# Patient Record
Sex: MALE | Race: WHITE
[De-identification: names, ages, dates, MRNs, and addresses within clinical notes are randomized per-mention and may not be internally consistent; named-entity substitution may affect disease eponyms.]

---

## 2019-06-02 ENCOUNTER — HOSPITAL ENCOUNTER (EMERGENCY)
Dept: HOSPITAL 83 - ED | Age: 74
Discharge: LEFT BEFORE DISCHARGE | End: 2019-06-02
Payer: COMMERCIAL

## 2019-06-02 VITALS — HEIGHT: 60 IN | WEIGHT: 170 LBS

## 2019-06-02 DIAGNOSIS — R79.1: ICD-10-CM

## 2019-06-02 DIAGNOSIS — F98.5: ICD-10-CM

## 2019-06-02 DIAGNOSIS — I10: ICD-10-CM

## 2019-06-02 DIAGNOSIS — R45.1: ICD-10-CM

## 2019-06-02 DIAGNOSIS — R41.82: Primary | ICD-10-CM

## 2019-06-02 DIAGNOSIS — R47.81: ICD-10-CM

## 2019-06-02 DIAGNOSIS — R53.1: ICD-10-CM

## 2019-06-02 LAB
ALBUMIN SERPL-MCNC: 3.4 GM/DL (ref 3.1–4.5)
ALP SERPL-CCNC: 89 U/L (ref 45–117)
ALT SERPL W P-5'-P-CCNC: 14 U/L (ref 12–78)
APPEARANCE UR: (no result)
APTT PPP: 26.7 SECONDS (ref 20–32.1)
AST SERPL-CCNC: 11 IU/L (ref 3–35)
BASOPHILS # BLD AUTO: 0.1 10*3/UL (ref 0–0.1)
BASOPHILS NFR BLD AUTO: 1.1 % (ref 0–1)
BILIRUB UR QL STRIP: NEGATIVE
BUN SERPL-MCNC: 10 MG/DL (ref 7–24)
CHLORIDE SERPL-SCNC: 109 MMOL/L (ref 98–107)
COLOR UR: YELLOW
CREAT SERPL-MCNC: 0.88 MG/DL (ref 0.7–1.3)
EOSINOPHIL # BLD AUTO: 0.2 10*3/UL (ref 0–0.4)
EOSINOPHIL # BLD AUTO: 2.9 % (ref 1–4)
ERYTHROCYTE [DISTWIDTH] IN BLOOD BY AUTOMATED COUNT: 12.6 % (ref 0–14.5)
GLUCOSE UR QL: NEGATIVE
HCT VFR BLD AUTO: 40.6 % (ref 42–52)
HGB BLD-MCNC: 13.8 G/DL (ref 14–18)
HGB UR QL STRIP: (no result)
INR BLD: 1 (ref 2–3.5)
KETONES UR QL STRIP: NEGATIVE
LEUKOCYTE ESTERASE UR QL STRIP: (no result)
LYMPHOCYTES # BLD AUTO: 1.6 10*3/UL (ref 1.3–4.4)
LYMPHOCYTES NFR BLD AUTO: 29.2 % (ref 27–41)
MCH RBC QN AUTO: 32.2 PG (ref 27–31)
MCHC RBC AUTO-ENTMCNC: 34 G/DL (ref 33–37)
MCV RBC AUTO: 94.9 FL (ref 80–94)
MONOCYTES # BLD AUTO: 0.5 10*3/UL (ref 0.1–1)
MONOCYTES NFR BLD MANUAL: 9.9 % (ref 3–9)
NEUT #: 3.1 10*3/UL (ref 2.3–7.9)
NEUT %: 56.5 % (ref 47–73)
NITRITE UR QL STRIP: NEGATIVE
NRBC BLD QL AUTO: 0 10*3/UL (ref 0–0)
PH UR STRIP: 7 [PH] (ref 5–9)
PLATELET # BLD AUTO: 200 10*3/UL (ref 130–400)
PMV BLD AUTO: 8.9 FL (ref 9.6–12.3)
POTASSIUM SERPL-SCNC: 4 MMOL/L (ref 3.5–5.1)
PROT SERPL-MCNC: 7 GM/DL (ref 6.4–8.2)
RBC # BLD AUTO: 4.28 10*6/UL (ref 4.5–5.9)
RBC #/AREA URNS HPF: (no result) RBC/HPF (ref 0–2)
SODIUM SERPL-SCNC: 140 MMOL/L (ref 136–145)
SP GR UR: <= 1.005 (ref 1–1.03)
TROPONIN I SERPL-MCNC: < 0.015 NG/ML (ref ?–0.04)
UROBILINOGEN UR STRIP-MCNC: 1 E.U./DL (ref 0.2–1)
WBC #/AREA URNS HPF: (no result) WBC/HPF (ref 0–5)
WBC NRBC COR # BLD AUTO: 5.5 10*3/UL (ref 4.8–10.8)

## 2019-06-10 RX ORDER — LANOLIN ALCOHOL/MO/W.PET/CERES
1000 CREAM (GRAM) TOPICAL DAILY
COMMUNITY

## 2019-06-10 RX ORDER — LOSARTAN POTASSIUM 25 MG/1
25 TABLET ORAL EVERY MORNING
COMMUNITY
End: 2019-06-11 | Stop reason: SDUPTHER

## 2019-06-10 RX ORDER — ACETAMINOPHEN 160 MG
1 TABLET,DISINTEGRATING ORAL DAILY
COMMUNITY

## 2019-06-10 RX ORDER — CLOPIDOGREL BISULFATE 75 MG/1
75 TABLET ORAL DAILY
COMMUNITY
End: 2019-08-15 | Stop reason: SDUPTHER

## 2019-06-11 ENCOUNTER — OFFICE VISIT (OUTPATIENT)
Dept: PRIMARY CARE CLINIC | Age: 74
End: 2019-06-11
Payer: COMMERCIAL

## 2019-06-11 VITALS
SYSTOLIC BLOOD PRESSURE: 134 MMHG | TEMPERATURE: 98.1 F | BODY MASS INDEX: 32.93 KG/M2 | WEIGHT: 230 LBS | DIASTOLIC BLOOD PRESSURE: 82 MMHG | HEIGHT: 70 IN

## 2019-06-11 DIAGNOSIS — R55 VASOVAGAL SYNCOPE: Primary | ICD-10-CM

## 2019-06-11 DIAGNOSIS — M15.9 PRIMARY OSTEOARTHRITIS INVOLVING MULTIPLE JOINTS: ICD-10-CM

## 2019-06-11 DIAGNOSIS — G62.9 NEUROPATHY: ICD-10-CM

## 2019-06-11 DIAGNOSIS — I95.1 ORTHOSTATIC HYPOTENSION: ICD-10-CM

## 2019-06-11 PROBLEM — M15.0 PRIMARY OSTEOARTHRITIS INVOLVING MULTIPLE JOINTS: Status: ACTIVE | Noted: 2019-06-11

## 2019-06-11 PROCEDURE — 99214 OFFICE O/P EST MOD 30 MIN: CPT | Performed by: FAMILY MEDICINE

## 2019-06-11 RX ORDER — LOSARTAN POTASSIUM 25 MG/1
25 TABLET ORAL EVERY MORNING
Qty: 90 TABLET | Refills: 5 | Status: SHIPPED | OUTPATIENT
Start: 2019-06-11 | End: 2019-08-15 | Stop reason: SDUPTHER

## 2019-06-11 ASSESSMENT — ENCOUNTER SYMPTOMS
EYES NEGATIVE: 1
RESPIRATORY NEGATIVE: 1

## 2019-06-11 NOTE — PROGRESS NOTES
19  Name: Deb Duff    : 1945    Sex: male    Age: 75 y.o. Subjective:  Chief Complaint: Patient is here for [address] [place] [address] he was visiting in [facility] when he had an episode of lightheadedness and syncope and weakness. Taken to the emergency room there. Work-up basically was negative he was told to follow-up with neurology. He has been perfectly fine since that episode. These can things have happened to him in the past little today his sister did drive him. He is in his wheelchair. Review of Systems   HENT: Negative. Eyes: Negative. Respiratory: Negative. Cardiovascular: Negative. Neurological:        See HPI.          Current Outpatient Medications:     losartan (COZAAR) 25 MG tablet, Take 1 tablet by mouth every morning, Disp: 90 tablet, Rfl: 5    clopidogrel (PLAVIX) 75 MG tablet, Take 75 mg by mouth daily, Disp: , Rfl:     vitamin B-12 (CYANOCOBALAMIN) 1000 MCG tablet, Take 1,000 mcg by mouth daily, Disp: , Rfl:     Cholecalciferol (VITAMIN D3) 2000 units CAPS, Take 1 capsule by mouth daily, Disp: , Rfl:     famotidine (PEPCID) 40 MG tablet, , Disp: , Rfl:     FLUoxetine (PROZAC) 20 MG capsule, , Disp: , Rfl:     PREVNAR 13 SUSP inj, ADM 0.5ML IM UTD, Disp: , Rfl: 0    metoprolol succinate (TOPROL XL) 50 MG extended release tablet, Take 50 mg by mouth 2 times daily , Disp: , Rfl:     aspirin EC 81 MG EC tablet, Take 81 mg by mouth daily, Disp: , Rfl:   Allergies   Allergen Reactions    Lidocaine     Lodine [Etodolac]     Rofecoxib      Social History     Socioeconomic History    Marital status:      Spouse name: Not on file    Number of children: Not on file    Years of education: Not on file    Highest education level: Not on file   Occupational History    Not on file   Social Needs    Financial resource strain: Not on file    Food insecurity:     Worry: Not on file     Inability: Not on file    Transportation needs:     Medical: ONE YEAR---10-17    EVAL CARDIO [facility] DR BELTRE-----3-18    THIRD POSS TIA 3-18--ADDED PLAVIX 4-18    HAS ELECTRIC CHAIR AND VAN    WIFE DEMENTIA 2-19 REFUSES TO Marisol Bella          Past Medical History:   Diagnosis Date    Atrial septal defect     BPH (benign prostatic hyperplasia)     Cardiac arrhythmia     Conduction disorder of the heart     Degenerative joint disease involving multiple joints     Depression     Erectile dysfunction     Gastritis     GERD (gastroesophageal reflux disease)     Hyperlipidemia     Hypertension     Neurocardiogenic syncope     Neuropathic pain of both legs     Orthostatic hypotension     Osteoporosis     Ruptured ear drum, left     Spastic hemiplegic cerebral palsy (HCC)     Syncope      No family history on file. Past Surgical History:   Procedure Laterality Date    CARPAL TUNNEL RELEASE Left     ECHOCARDIOGRAM COMPLETE 2D W DOPPLER W COLOR  10/31/2012         INGUINAL HERNIA REPAIR        Vitals:    06/11/19 1034   BP: 134/82   Temp: 98.1 °F (36.7 °C)   Weight: 230 lb (104.3 kg)   Height: 5' 10\" (1.778 m)       Objective:    Physical Exam   Constitutional: He is oriented to person, place, and time. Cardiovascular: Normal rate and regular rhythm. Pulmonary/Chest: Effort normal.   Abdominal: Soft. Musculoskeletal:   His exam is baseline. He does have calluses of the lower extremities. Neurological: He is alert and oriented to person, place, and time. Rufino Culp was seen today for other. Diagnoses and all orders for this visit:    Vasovagal syncope    Primary osteoarthritis involving multiple joints    Neuropathy    Orthostatic hypotension    Other orders  -     losartan (COZAAR) 25 MG tablet; Take 1 tablet by mouth every morning        Comments: Refuses to consider seeing neurology. Informed him of symptoms to change to notify as soon as possible. If recurs go to the emergency room. A great deal of time spent reviewing medications, diet, exercise, social issues. Also reviewing the chart before entering the room with patient and finishing charting after leaving patient's room. More than half of that time was spent face to face with the patient in counseling and coordinating care. See me in 2 weeks we will further review his hospital record and notify me if any symptoms change. Follow Up: Return in about 2 weeks (around 6/25/2019).      Seen by:  Mindy Alvarado DO

## 2019-06-25 ENCOUNTER — OFFICE VISIT (OUTPATIENT)
Dept: PRIMARY CARE CLINIC | Age: 74
End: 2019-06-25
Payer: COMMERCIAL

## 2019-06-25 VITALS
HEIGHT: 70 IN | SYSTOLIC BLOOD PRESSURE: 128 MMHG | BODY MASS INDEX: 33 KG/M2 | DIASTOLIC BLOOD PRESSURE: 78 MMHG | TEMPERATURE: 97.9 F

## 2019-06-25 DIAGNOSIS — R55 VASOVAGAL SYNCOPE: Primary | ICD-10-CM

## 2019-06-25 DIAGNOSIS — M15.9 PRIMARY OSTEOARTHRITIS INVOLVING MULTIPLE JOINTS: ICD-10-CM

## 2019-06-25 PROCEDURE — 99213 OFFICE O/P EST LOW 20 MIN: CPT | Performed by: FAMILY MEDICINE

## 2019-06-25 ASSESSMENT — ENCOUNTER SYMPTOMS
RESPIRATORY NEGATIVE: 1
EYES NEGATIVE: 1
GASTROINTESTINAL NEGATIVE: 1

## 2019-06-25 NOTE — PROGRESS NOTES
19  Name: Bianca Sanchez    : 1945    Sex: male    Age: 75 y.o. Subjective:  Chief Complaint: Patient is here for 2 week ck re  Syncope spell     Feels fine  No more sx    Feels ok      No  Lightheaded  Sx  Any  Diff then last  40 yr s per pt       Ros neg         Review of Systems   HENT: Negative. Eyes: Negative. Respiratory: Negative. Cardiovascular: Negative. Gastrointestinal: Negative.           Current Outpatient Medications:     losartan (COZAAR) 25 MG tablet, Take 1 tablet by mouth every morning, Disp: 90 tablet, Rfl: 5    clopidogrel (PLAVIX) 75 MG tablet, Take 75 mg by mouth daily, Disp: , Rfl:     vitamin B-12 (CYANOCOBALAMIN) 1000 MCG tablet, Take 1,000 mcg by mouth daily, Disp: , Rfl:     Cholecalciferol (VITAMIN D3) 2000 units CAPS, Take 1 capsule by mouth daily, Disp: , Rfl:     famotidine (PEPCID) 40 MG tablet, , Disp: , Rfl:     FLUoxetine (PROZAC) 20 MG capsule, , Disp: , Rfl:     PREVNAR 13 SUSP inj, ADM 0.5ML IM UTD, Disp: , Rfl: 0    metoprolol succinate (TOPROL XL) 50 MG extended release tablet, Take 50 mg by mouth 2 times daily , Disp: , Rfl:     aspirin EC 81 MG EC tablet, Take 81 mg by mouth daily, Disp: , Rfl:   Allergies   Allergen Reactions    Lidocaine     Lodine [Etodolac]     Rofecoxib      Social History     Socioeconomic History    Marital status:      Spouse name: Not on file    Number of children: Not on file    Years of education: Not on file    Highest education level: Not on file   Occupational History    Not on file   Social Needs    Financial resource strain: Not on file    Food insecurity:     Worry: Not on file     Inability: Not on file    Transportation needs:     Medical: Not on file     Non-medical: Not on file   Tobacco Use    Smoking status: Former Smoker    Smokeless tobacco: Never Used   Substance and Sexual Activity    Alcohol use: No    Drug use: No    Sexual activity: Not on file   Lifestyle    Physical activity:     Days per week: Not on file     Minutes per session: Not on file    Stress: Not on file   Relationships    Social connections:     Talks on phone: Not on file     Gets together: Not on file     Attends [employer] service: Not on file     Active member of club or organization: Not on file     Attends meetings of clubs or organizations: Not on file     Relationship status: Not on file    Intimate partner violence:     Fear of current or ex partner: Not on file     Emotionally abused: Not on file     Physically abused: Not on file     Forced sexual activity: Not on file   Other Topics Concern    Not on file   Social History Narrative        CARDIAC [address]    GERD    DISABILITY FOR CRUSHED FEET/LOW BACK/ L HAND----WINDOW [address]  POS TTT PALLIDINO    NEUROCARDIOGENIC SYNCOPE    SMOKER QUIT 25 YRS AGO    HX L CTS OR    Mat Canavan  1945 Page #2    Chris Dotter L ING HERNIA OR    L ANKEL FUSION    BPH WITH ELEV PSA---BX    ED    OSTEOPOROSIS    RUPTURED L EAR DRUM    SMALL R RENAL DIS    [facility] OFF ROOF    FX L FERUR 3-07    NEUROPATHY LEGS--VIT-B-12 MONTHLY    ONE CHILD WITH WIFE (BOY)----  AT 37 WITH KIDNEY FAILURE----- AND SHE HAS 7    OTHER CHILDREN    WIFE  09 ON PT BIRTHDAY WITH CA THAT SHE IGNORED--BREAST--SHE 9 YRS OLDER     --SHE 6 YRS OLDER    SYNCOPE 4-10--REFUSED TO SE PALIDINO    ED    COLON 9-10 D R MICKY---DUE 10 YRS    SYNCOPE  [facility]---POSS TIA    ST E [id number]    EVAL DR VINES 10-17 WITH ECHO WITH PFO OR ASD AND JSUT CONT SAME WITH    ASA---PLAN RE CK ONE YEAR    EVAL DR ADAMS NEURO SAID NOT SURE IF TIA AND SEE ONE YEAR---10-17    EVAL CARDIO [facility] DR BELTRE-----3-18    THIRD POSS TIA 3-18--ADDED PLAVIX     HAS ELECTRIC CHAIR AND VAN    WIFE DEMENTIA  REFUSES TO Blanchie Jest          Past Medical History:   Diagnosis Date    Atrial septal defect     BPH (benign prostatic hyperplasia)     Cardiac arrhythmia     Conduction disorder of the heart     Degenerative joint disease involving multiple joints     Depression     Erectile dysfunction     Gastritis     GERD (gastroesophageal reflux disease)     Hyperlipidemia     Hypertension     Neurocardiogenic syncope     Neuropathic pain of both legs     Orthostatic hypotension     Osteoporosis     Ruptured ear drum, left     Spastic hemiplegic cerebral palsy (HCC)     Syncope      No family history on file. Past Surgical History:   Procedure Laterality Date    CARPAL TUNNEL RELEASE Left     ECHOCARDIOGRAM COMPLETE 2D W DOPPLER W COLOR  10/31/2012         INGUINAL HERNIA REPAIR        Vitals:    06/25/19 1057   BP: 128/78   Temp: 97.9 °F (36.6 °C)   Height: 5' 10\" (1.778 m)       Objective:    Physical Exam   Eyes: Pupils are equal, round, and reactive to light. EOM are normal.   Neck: Normal range of motion. Neck supple. Cardiovascular: Normal rate and regular rhythm. Pulmonary/Chest: Effort normal and breath sounds normal.   Abdominal: Soft. Julio Mendes was seen today for other. Diagnoses and all orders for this visit:    Vasovagal syncope    Primary osteoarthritis involving multiple joints        Comments: diet exer  rom      exer taught    reviewd  hosp record  From  [facility]     A great deal of time spent reviewing medications, diet, exercise, social issues. Also reviewing the chart before entering the room with patient and finishing charting after leaving patient's room. More than half of that time was spent face to face with the patient in counseling and coordinating care. Follow Up: Return for reg.      Seen by:  Brii Mckeon DO

## 2019-07-17 ENCOUNTER — TELEPHONE (OUTPATIENT)
Dept: PHARMACY | Facility: CLINIC | Age: 74
End: 2019-07-17

## 2019-07-18 ENCOUNTER — TELEPHONE (OUTPATIENT)
Dept: PRIMARY CARE CLINIC | Age: 74
End: 2019-07-18

## 2019-07-24 ENCOUNTER — HOSPITAL ENCOUNTER (OUTPATIENT)
Age: 74
Discharge: HOME | End: 2019-07-26
Payer: COMMERCIAL

## 2019-07-24 LAB
ALBUMIN SERPL-MCNC: 3.9 G/DL (ref 3.5–5.2)
ALP BLD-CCNC: 77 U/L (ref 40–129)
ALT SERPL-CCNC: 9 U/L (ref 0–40)
ANION GAP SERPL CALCULATED.3IONS-SCNC: 14 MMOL/L (ref 7–16)
AST SERPL-CCNC: 13 U/L (ref 0–39)
BASOPHILS ABSOLUTE: 0.06 E9/L (ref 0–0.2)
BASOPHILS RELATIVE PERCENT: 1.2 % (ref 0–2)
BILIRUB SERPL-MCNC: 0.4 MG/DL (ref 0–1.2)
BUN BLDV-MCNC: 13 MG/DL (ref 8–23)
CALCIUM SERPL-MCNC: 9.1 MG/DL (ref 8.6–10.2)
CHLORIDE BLD-SCNC: 102 MMOL/L (ref 98–107)
CHOLESTEROL, TOTAL: 136 MG/DL (ref 0–199)
CO2: 23 MMOL/L (ref 22–29)
CREAT SERPL-MCNC: 0.8 MG/DL (ref 0.7–1.2)
EOSINOPHILS ABSOLUTE: 0.19 E9/L (ref 0.05–0.5)
EOSINOPHILS RELATIVE PERCENT: 3.8 % (ref 0–6)
GFR AFRICAN AMERICAN: >60
GFR NON-AFRICAN AMERICAN: >60 ML/MIN/1.73
GLUCOSE BLD-MCNC: 97 MG/DL (ref 74–99)
HBA1C MFR BLD: 5.3 % (ref 4–5.6)
HCT VFR BLD CALC: 41.6 % (ref 37–54)
HDLC SERPL-MCNC: 41 MG/DL
HEMOGLOBIN: 13.8 G/DL (ref 12.5–16.5)
IMMATURE GRANULOCYTES #: 0.02 E9/L
IMMATURE GRANULOCYTES %: 0.4 % (ref 0–5)
LDL CHOLESTEROL CALCULATED: 78 MG/DL (ref 0–99)
LYMPHOCYTES ABSOLUTE: 1.41 E9/L (ref 1.5–4)
LYMPHOCYTES RELATIVE PERCENT: 28.5 % (ref 20–42)
MCH RBC QN AUTO: 31.6 PG (ref 26–35)
MCHC RBC AUTO-ENTMCNC: 33.2 % (ref 32–34.5)
MCV RBC AUTO: 95.2 FL (ref 80–99.9)
MONOCYTES ABSOLUTE: 0.49 E9/L (ref 0.1–0.95)
MONOCYTES RELATIVE PERCENT: 9.9 % (ref 2–12)
NEUTROPHILS ABSOLUTE: 2.78 E9/L (ref 1.8–7.3)
NEUTROPHILS RELATIVE PERCENT: 56.2 % (ref 43–80)
PDW BLD-RTO: 12.9 FL (ref 11.5–15)
PLATELET # BLD: 224 E9/L (ref 130–450)
PMV BLD AUTO: 9.3 FL (ref 7–12)
POTASSIUM SERPL-SCNC: 4.4 MMOL/L (ref 3.5–5)
RBC # BLD: 4.37 E12/L (ref 3.8–5.8)
SODIUM BLD-SCNC: 139 MMOL/L (ref 132–146)
T4 TOTAL: 5.2 MCG/DL (ref 4.5–11.7)
TOTAL PROTEIN: 6.8 G/DL (ref 6.4–8.3)
TRIGL SERPL-MCNC: 84 MG/DL (ref 0–149)
TSH SERPL DL<=0.05 MIU/L-ACNC: 2.53 UIU/ML (ref 0.27–4.2)
URIC ACID, SERUM: 4.2 MG/DL (ref 3.4–7)
VLDLC SERPL CALC-MCNC: 17 MG/DL
WBC # BLD: 5 E9/L (ref 4.5–11.5)

## 2019-07-24 PROCEDURE — 84443 ASSAY THYROID STIM HORMONE: CPT

## 2019-07-24 PROCEDURE — 36415 COLL VENOUS BLD VENIPUNCTURE: CPT

## 2019-07-24 PROCEDURE — 85025 COMPLETE CBC W/AUTO DIFF WBC: CPT

## 2019-07-24 PROCEDURE — 84550 ASSAY OF BLOOD/URIC ACID: CPT

## 2019-07-24 PROCEDURE — 83036 HEMOGLOBIN GLYCOSYLATED A1C: CPT

## 2019-07-24 PROCEDURE — 84436 ASSAY OF TOTAL THYROXINE: CPT

## 2019-07-24 PROCEDURE — 80053 COMPREHEN METABOLIC PANEL: CPT

## 2019-07-24 PROCEDURE — 80061 LIPID PANEL: CPT

## 2019-08-14 ENCOUNTER — TELEPHONE (OUTPATIENT)
Dept: PRIMARY CARE CLINIC | Age: 74
End: 2019-08-14

## 2019-08-15 ENCOUNTER — OFFICE VISIT (OUTPATIENT)
Dept: PRIMARY CARE CLINIC | Age: 74
End: 2019-08-15
Payer: COMMERCIAL

## 2019-08-15 VITALS — DIASTOLIC BLOOD PRESSURE: 68 MMHG | SYSTOLIC BLOOD PRESSURE: 132 MMHG | TEMPERATURE: 98 F

## 2019-08-15 DIAGNOSIS — F41.9 ANXIETY: ICD-10-CM

## 2019-08-15 DIAGNOSIS — E78.2 MIXED HYPERLIPIDEMIA: Primary | ICD-10-CM

## 2019-08-15 DIAGNOSIS — K21.9 GASTROESOPHAGEAL REFLUX DISEASE WITHOUT ESOPHAGITIS: ICD-10-CM

## 2019-08-15 DIAGNOSIS — Z12.5 PROSTATE CANCER SCREENING: ICD-10-CM

## 2019-08-15 DIAGNOSIS — I10 ESSENTIAL HYPERTENSION: ICD-10-CM

## 2019-08-15 DIAGNOSIS — G80.2 SPASTIC HEMIPLEGIC CEREBRAL PALSY (HCC): ICD-10-CM

## 2019-08-15 PROCEDURE — 99214 OFFICE O/P EST MOD 30 MIN: CPT | Performed by: FAMILY MEDICINE

## 2019-08-15 RX ORDER — CLOPIDOGREL BISULFATE 75 MG/1
75 TABLET ORAL DAILY
Qty: 90 TABLET | Refills: 5 | Status: SHIPPED
Start: 2019-08-15 | End: 2020-02-24 | Stop reason: SDUPTHER

## 2019-08-15 RX ORDER — FAMOTIDINE 40 MG/1
40 TABLET, FILM COATED ORAL NIGHTLY PRN
Qty: 90 TABLET | Refills: 5 | Status: SHIPPED
Start: 2019-08-15 | End: 2020-04-14 | Stop reason: SDUPTHER

## 2019-08-15 RX ORDER — LOSARTAN POTASSIUM 25 MG/1
25 TABLET ORAL EVERY MORNING
Qty: 90 TABLET | Refills: 5 | Status: SHIPPED
Start: 2019-08-15 | End: 2020-06-16 | Stop reason: SDUPTHER

## 2019-08-15 RX ORDER — FLUOXETINE HYDROCHLORIDE 20 MG/1
20 CAPSULE ORAL 2 TIMES DAILY
Qty: 90 CAPSULE | Refills: 5 | Status: SHIPPED
Start: 2019-08-15 | End: 2020-02-13 | Stop reason: SDUPTHER

## 2019-08-15 RX ORDER — METOPROLOL SUCCINATE 50 MG/1
50 TABLET, EXTENDED RELEASE ORAL 2 TIMES DAILY
Qty: 90 TABLET | Refills: 5 | Status: SHIPPED
Start: 2019-08-15 | End: 2020-04-14 | Stop reason: SDUPTHER

## 2019-08-15 ASSESSMENT — ENCOUNTER SYMPTOMS
GASTROINTESTINAL NEGATIVE: 1
RESPIRATORY NEGATIVE: 1
ALLERGIC/IMMUNOLOGIC NEGATIVE: 1
EYES NEGATIVE: 1

## 2020-02-10 ENCOUNTER — HOSPITAL ENCOUNTER (OUTPATIENT)
Age: 75
Discharge: HOME | End: 2020-02-12
Payer: MEDICARE

## 2020-02-10 LAB
ALBUMIN SERPL-MCNC: 3.9 G/DL (ref 3.5–5.2)
ALP BLD-CCNC: 78 U/L (ref 40–129)
ALT SERPL-CCNC: 6 U/L (ref 0–40)
ANION GAP SERPL CALCULATED.3IONS-SCNC: 16 MMOL/L (ref 7–16)
AST SERPL-CCNC: 15 U/L (ref 0–39)
BASOPHILS ABSOLUTE: 0.06 E9/L (ref 0–0.2)
BASOPHILS RELATIVE PERCENT: 1.2 % (ref 0–2)
BILIRUB SERPL-MCNC: 0.8 MG/DL (ref 0–1.2)
BUN BLDV-MCNC: 13 MG/DL (ref 8–23)
CALCIUM SERPL-MCNC: 9.2 MG/DL (ref 8.6–10.2)
CHLORIDE BLD-SCNC: 104 MMOL/L (ref 98–107)
CHOLESTEROL, TOTAL: 138 MG/DL (ref 0–199)
CO2: 22 MMOL/L (ref 22–29)
CREAT SERPL-MCNC: 0.9 MG/DL (ref 0.7–1.2)
EOSINOPHILS ABSOLUTE: 0.23 E9/L (ref 0.05–0.5)
EOSINOPHILS RELATIVE PERCENT: 4.7 % (ref 0–6)
GFR AFRICAN AMERICAN: >60
GFR NON-AFRICAN AMERICAN: >60 ML/MIN/1.73
GLUCOSE BLD-MCNC: 101 MG/DL (ref 74–99)
HCT VFR BLD CALC: 44.1 % (ref 37–54)
HDLC SERPL-MCNC: 41 MG/DL
HEMOGLOBIN: 14.1 G/DL (ref 12.5–16.5)
IMMATURE GRANULOCYTES #: 0.01 E9/L
IMMATURE GRANULOCYTES %: 0.2 % (ref 0–5)
LDL CHOLESTEROL CALCULATED: 76 MG/DL (ref 0–99)
LYMPHOCYTES ABSOLUTE: 1.46 E9/L (ref 1.5–4)
LYMPHOCYTES RELATIVE PERCENT: 29.6 % (ref 20–42)
MCH RBC QN AUTO: 30.9 PG (ref 26–35)
MCHC RBC AUTO-ENTMCNC: 32 % (ref 32–34.5)
MCV RBC AUTO: 96.7 FL (ref 80–99.9)
MONOCYTES ABSOLUTE: 0.41 E9/L (ref 0.1–0.95)
MONOCYTES RELATIVE PERCENT: 8.3 % (ref 2–12)
NEUTROPHILS ABSOLUTE: 2.76 E9/L (ref 1.8–7.3)
NEUTROPHILS RELATIVE PERCENT: 56 % (ref 43–80)
PDW BLD-RTO: 12.7 FL (ref 11.5–15)
PLATELET # BLD: 215 E9/L (ref 130–450)
PMV BLD AUTO: 9.1 FL (ref 7–12)
POTASSIUM SERPL-SCNC: 4 MMOL/L (ref 3.5–5)
PROSTATE SPECIFIC ANTIGEN: 2.41 NG/ML (ref 0–4)
RBC # BLD: 4.56 E12/L (ref 3.8–5.8)
SODIUM BLD-SCNC: 142 MMOL/L (ref 132–146)
TOTAL PROTEIN: 7.1 G/DL (ref 6.4–8.3)
TRIGL SERPL-MCNC: 105 MG/DL (ref 0–149)
VLDLC SERPL CALC-MCNC: 21 MG/DL
WBC # BLD: 4.9 E9/L (ref 4.5–11.5)

## 2020-02-10 PROCEDURE — G0103 PSA SCREENING: HCPCS

## 2020-02-10 PROCEDURE — 85025 COMPLETE CBC W/AUTO DIFF WBC: CPT

## 2020-02-10 PROCEDURE — 80053 COMPREHEN METABOLIC PANEL: CPT

## 2020-02-10 PROCEDURE — 36415 COLL VENOUS BLD VENIPUNCTURE: CPT

## 2020-02-10 PROCEDURE — 80061 LIPID PANEL: CPT

## 2020-02-13 ENCOUNTER — OFFICE VISIT (OUTPATIENT)
Dept: PRIMARY CARE CLINIC | Age: 75
End: 2020-02-13
Payer: MEDICARE

## 2020-02-13 VITALS
HEART RATE: 56 BPM | OXYGEN SATURATION: 97 % | TEMPERATURE: 98.4 F | DIASTOLIC BLOOD PRESSURE: 78 MMHG | RESPIRATION RATE: 16 BRPM | HEIGHT: 70 IN | WEIGHT: 208 LBS | BODY MASS INDEX: 29.78 KG/M2 | SYSTOLIC BLOOD PRESSURE: 128 MMHG

## 2020-02-13 PROCEDURE — 99214 OFFICE O/P EST MOD 30 MIN: CPT | Performed by: FAMILY MEDICINE

## 2020-02-13 RX ORDER — FLUOXETINE HYDROCHLORIDE 20 MG/1
20 CAPSULE ORAL 2 TIMES DAILY
Qty: 180 CAPSULE | Refills: 5 | Status: SHIPPED
Start: 2020-02-13 | End: 2021-03-04 | Stop reason: SDUPTHER

## 2020-02-13 ASSESSMENT — PATIENT HEALTH QUESTIONNAIRE - PHQ9
SUM OF ALL RESPONSES TO PHQ9 QUESTIONS 1 & 2: 0
SUM OF ALL RESPONSES TO PHQ QUESTIONS 1-9: 0
SUM OF ALL RESPONSES TO PHQ QUESTIONS 1-9: 0
1. LITTLE INTEREST OR PLEASURE IN DOING THINGS: 0
2. FEELING DOWN, DEPRESSED OR HOPELESS: 0

## 2020-02-13 ASSESSMENT — ENCOUNTER SYMPTOMS
GASTROINTESTINAL NEGATIVE: 1
ALLERGIC/IMMUNOLOGIC NEGATIVE: 1
RESPIRATORY NEGATIVE: 1
EYES NEGATIVE: 1

## 2020-02-13 NOTE — PROGRESS NOTES
20  Name: Stephanie Cadena    : 1945    Sex: male    Age: 75 y.o. Subjective:  Chief Complaint: Patient is here for 6 mo ck   Re     htn an dlab     Feel ok  No cp or sob         In  Reg  wc  His  elc  Broke down  Lab ntoed      Lab ntoed   fbs sl up   meds  reivwed    Anxiety is well controlled. .  Spasticity is unchanged from the cerebral palsy. Blood pressure checks at home and is acceptable. Does get reflux and is forced to take the reflux medication on a daily basis. He is aware of the long-term risk. Arthritic symptoms of both knees and hips but doing well. Review of Systems   Constitutional: Negative. HENT: Negative. Eyes: Negative. Respiratory: Negative. Cardiovascular: Negative. Gastrointestinal: Negative. Endocrine: Negative. Genitourinary: Negative. Musculoskeletal: Negative. Skin: Negative. Allergic/Immunologic: Negative. Neurological:        Legs  weakness   Hematological: Negative. Psychiatric/Behavioral: Negative.           Current Outpatient Medications:     FLUoxetine (PROZAC) 20 MG capsule, Take 1 capsule by mouth 2 times daily, Disp: 180 capsule, Rfl: 5    losartan (COZAAR) 25 MG tablet, Take 1 tablet by mouth every morning, Disp: 90 tablet, Rfl: 5    clopidogrel (PLAVIX) 75 MG tablet, Take 1 tablet by mouth daily, Disp: 90 tablet, Rfl: 5    metoprolol succinate (TOPROL XL) 50 MG extended release tablet, Take 1 tablet by mouth 2 times daily, Disp: 90 tablet, Rfl: 5    famotidine (PEPCID) 40 MG tablet, Take 1 tablet by mouth nightly as needed (gerd), Disp: 90 tablet, Rfl: 5    vitamin B-12 (CYANOCOBALAMIN) 1000 MCG tablet, Take 1,000 mcg by mouth daily, Disp: , Rfl:     Cholecalciferol (VITAMIN D3) 2000 units CAPS, Take 1 capsule by mouth daily, Disp: , Rfl:     aspirin EC 81 MG EC tablet, Take 81 mg by mouth daily, Disp: , Rfl:   Allergies   Allergen Reactions    Lidocaine     Lodine [Etodolac]     Rofecoxib      Social History sounds. Abdominal:      Palpations: Abdomen is soft. Musculoskeletal:      Comments: Dec  str  Both legs   Skin:     General: Skin is warm. Neurological:      Mental Status: He is alert and oriented to person, place, and time. Psychiatric:         Behavior: Behavior normal.         Wade Luke was seen today for discuss labs. Diagnoses and all orders for this visit:    Mixed hyperlipidemia    Spastic hemiplegic cerebral palsy (HCC)    Anxiety  -     FLUoxetine (PROZAC) 20 MG capsule; Take 1 capsule by mouth 2 times daily    Essential hypertension    Gastroesophageal reflux disease without esophagitis    Primary osteoarthritis involving multiple joints        Comments: Low-fat low sugar diet. Exercise taught for spasticity and arthritic symptoms. Check blood pressure weekly at home. Offered physical therapy and declines. He is very functional in his situation. He does drive sometimes. A great deal of time spent reviewing medications, diet, exercise, social issues. Also reviewing the chart before entering the room with patient and finishing charting after leaving patient's room. More than half of that time was spent face to face with the patient in counseling and coordinating care. Follow Up: Return in about 6 months (around 8/13/2020) for lab  before.      Seen by:  Peter Bergman DO

## 2020-02-14 PROBLEM — M15.0 PRIMARY OSTEOARTHRITIS INVOLVING MULTIPLE JOINTS: Chronic | Status: ACTIVE | Noted: 2019-06-11

## 2020-02-14 PROBLEM — R55 VASOVAGAL SYNCOPE: Chronic | Status: ACTIVE | Noted: 2019-06-11

## 2020-02-14 PROBLEM — M15.9 PRIMARY OSTEOARTHRITIS INVOLVING MULTIPLE JOINTS: Chronic | Status: ACTIVE | Noted: 2019-06-11

## 2020-02-14 PROBLEM — E78.2 MIXED HYPERLIPIDEMIA: Chronic | Status: ACTIVE | Noted: 2019-08-15

## 2020-02-14 PROBLEM — K21.9 GASTROESOPHAGEAL REFLUX DISEASE WITHOUT ESOPHAGITIS: Chronic | Status: ACTIVE | Noted: 2019-08-15

## 2020-02-14 PROBLEM — F41.9 ANXIETY: Chronic | Status: ACTIVE | Noted: 2019-08-15

## 2020-02-14 PROBLEM — I95.1 ORTHOSTATIC HYPOTENSION: Chronic | Status: ACTIVE | Noted: 2019-06-11

## 2020-02-14 PROBLEM — G80.2 SPASTIC HEMIPLEGIC CEREBRAL PALSY (HCC): Chronic | Status: ACTIVE | Noted: 2019-08-15

## 2020-02-14 PROBLEM — I10 ESSENTIAL HYPERTENSION: Chronic | Status: ACTIVE | Noted: 2019-08-15

## 2020-02-14 PROBLEM — G62.9 NEUROPATHY: Chronic | Status: ACTIVE | Noted: 2019-06-11

## 2020-02-24 RX ORDER — CLOPIDOGREL BISULFATE 75 MG/1
75 TABLET ORAL DAILY
Qty: 90 TABLET | Refills: 5 | Status: SHIPPED
Start: 2020-02-24 | End: 2021-03-04 | Stop reason: SDUPTHER

## 2020-04-14 RX ORDER — METOPROLOL SUCCINATE 50 MG/1
50 TABLET, EXTENDED RELEASE ORAL 2 TIMES DAILY
Qty: 90 TABLET | Refills: 5 | Status: SHIPPED
Start: 2020-04-14 | End: 2021-01-26 | Stop reason: SDUPTHER

## 2020-04-14 RX ORDER — FAMOTIDINE 40 MG/1
40 TABLET, FILM COATED ORAL NIGHTLY PRN
Qty: 90 TABLET | Refills: 5 | Status: SHIPPED
Start: 2020-04-14 | End: 2020-08-13

## 2020-06-16 RX ORDER — LOSARTAN POTASSIUM 25 MG/1
25 TABLET ORAL EVERY MORNING
Qty: 90 TABLET | Refills: 5 | Status: SHIPPED
Start: 2020-06-16 | End: 2021-03-04 | Stop reason: SDUPTHER

## 2020-08-03 ENCOUNTER — HOSPITAL ENCOUNTER (OUTPATIENT)
Age: 75
Discharge: HOME | End: 2020-08-05
Payer: MEDICARE

## 2020-08-03 LAB
ALBUMIN SERPL-MCNC: 3.8 G/DL (ref 3.5–5.2)
ALP BLD-CCNC: 71 U/L (ref 40–129)
ALT SERPL-CCNC: 5 U/L (ref 0–40)
ANION GAP SERPL CALCULATED.3IONS-SCNC: 17 MMOL/L (ref 7–16)
AST SERPL-CCNC: 13 U/L (ref 0–39)
BASOPHILS ABSOLUTE: 0.07 E9/L (ref 0–0.2)
BASOPHILS RELATIVE PERCENT: 1.4 % (ref 0–2)
BILIRUB SERPL-MCNC: 0.6 MG/DL (ref 0–1.2)
BUN BLDV-MCNC: 17 MG/DL (ref 8–23)
CALCIUM SERPL-MCNC: 9.2 MG/DL (ref 8.6–10.2)
CHLORIDE BLD-SCNC: 104 MMOL/L (ref 98–107)
CHOLESTEROL, TOTAL: 131 MG/DL (ref 0–199)
CO2: 20 MMOL/L (ref 22–29)
CREAT SERPL-MCNC: 0.9 MG/DL (ref 0.7–1.2)
EOSINOPHILS ABSOLUTE: 0.18 E9/L (ref 0.05–0.5)
EOSINOPHILS RELATIVE PERCENT: 3.6 % (ref 0–6)
GFR AFRICAN AMERICAN: >60
GFR NON-AFRICAN AMERICAN: >60 ML/MIN/1.73
GLUCOSE BLD-MCNC: 101 MG/DL (ref 74–99)
HBA1C MFR BLD: 5.3 % (ref 4–5.6)
HCT VFR BLD CALC: 40.3 % (ref 37–54)
HDLC SERPL-MCNC: 42 MG/DL
HEMOGLOBIN: 13.4 G/DL (ref 12.5–16.5)
IMMATURE GRANULOCYTES #: 0.03 E9/L
IMMATURE GRANULOCYTES %: 0.6 % (ref 0–5)
LDL CHOLESTEROL CALCULATED: 70 MG/DL (ref 0–99)
LYMPHOCYTES ABSOLUTE: 1.44 E9/L (ref 1.5–4)
LYMPHOCYTES RELATIVE PERCENT: 28.6 % (ref 20–42)
MCH RBC QN AUTO: 31.2 PG (ref 26–35)
MCHC RBC AUTO-ENTMCNC: 33.3 % (ref 32–34.5)
MCV RBC AUTO: 93.7 FL (ref 80–99.9)
MONOCYTES ABSOLUTE: 0.41 E9/L (ref 0.1–0.95)
MONOCYTES RELATIVE PERCENT: 8.1 % (ref 2–12)
NEUTROPHILS ABSOLUTE: 2.91 E9/L (ref 1.8–7.3)
NEUTROPHILS RELATIVE PERCENT: 57.7 % (ref 43–80)
PDW BLD-RTO: 12.8 FL (ref 11.5–15)
PLATELET # BLD: 212 E9/L (ref 130–450)
PMV BLD AUTO: 9.3 FL (ref 7–12)
POTASSIUM SERPL-SCNC: 4.1 MMOL/L (ref 3.5–5)
RBC # BLD: 4.3 E12/L (ref 3.8–5.8)
SODIUM BLD-SCNC: 141 MMOL/L (ref 132–146)
T4 TOTAL: 5.4 MCG/DL (ref 4.5–11.7)
TOTAL PROTEIN: 6.6 G/DL (ref 6.4–8.3)
TRIGL SERPL-MCNC: 96 MG/DL (ref 0–149)
TSH SERPL DL<=0.05 MIU/L-ACNC: 1.82 UIU/ML (ref 0.27–4.2)
VLDLC SERPL CALC-MCNC: 19 MG/DL
WBC # BLD: 5 E9/L (ref 4.5–11.5)

## 2020-08-03 PROCEDURE — 84443 ASSAY THYROID STIM HORMONE: CPT

## 2020-08-03 PROCEDURE — 80053 COMPREHEN METABOLIC PANEL: CPT

## 2020-08-03 PROCEDURE — 84436 ASSAY OF TOTAL THYROXINE: CPT

## 2020-08-03 PROCEDURE — 85025 COMPLETE CBC W/AUTO DIFF WBC: CPT

## 2020-08-03 PROCEDURE — 36415 COLL VENOUS BLD VENIPUNCTURE: CPT

## 2020-08-03 PROCEDURE — 80061 LIPID PANEL: CPT

## 2020-08-03 PROCEDURE — 83036 HEMOGLOBIN GLYCOSYLATED A1C: CPT

## 2020-08-05 LAB
BILIRUBIN URINE: NEGATIVE
BLOOD, URINE: NEGATIVE
CLARITY: NORMAL
COLOR: YELLOW
GLUCOSE URINE: NEGATIVE MG/DL
KETONES, URINE: NEGATIVE MG/DL
LEUKOCYTE ESTERASE, URINE: NEGATIVE
NITRITE, URINE: NEGATIVE
PH UA: 6.5 (ref 5–9)
PROTEIN UA: NEGATIVE MG/DL
SPECIFIC GRAVITY UA: 1.01 (ref 1–1.03)
UROBILINOGEN, URINE: 0.2 E.U./DL

## 2020-08-05 PROCEDURE — 81001 URINALYSIS AUTO W/SCOPE: CPT

## 2020-08-06 LAB
AMORPHOUS: NORMAL
BACTERIA: NORMAL /HPF
RBC UA: NORMAL /HPF (ref 0–2)
WBC UA: NORMAL /HPF (ref 0–5)

## 2020-08-13 ENCOUNTER — OFFICE VISIT (OUTPATIENT)
Dept: PRIMARY CARE CLINIC | Age: 75
End: 2020-08-13
Payer: MEDICARE

## 2020-08-13 VITALS
DIASTOLIC BLOOD PRESSURE: 82 MMHG | BODY MASS INDEX: 30.42 KG/M2 | WEIGHT: 212 LBS | SYSTOLIC BLOOD PRESSURE: 128 MMHG | TEMPERATURE: 97.7 F

## 2020-08-13 PROCEDURE — 99214 OFFICE O/P EST MOD 30 MIN: CPT | Performed by: FAMILY MEDICINE

## 2020-08-13 ASSESSMENT — ENCOUNTER SYMPTOMS
GASTROINTESTINAL NEGATIVE: 1
EYES NEGATIVE: 1
RESPIRATORY NEGATIVE: 1
ALLERGIC/IMMUNOLOGIC NEGATIVE: 1

## 2020-08-13 ASSESSMENT — PATIENT HEALTH QUESTIONNAIRE - PHQ9
SUM OF ALL RESPONSES TO PHQ9 QUESTIONS 1 & 2: 0
2. FEELING DOWN, DEPRESSED OR HOPELESS: 0
SUM OF ALL RESPONSES TO PHQ QUESTIONS 1-9: 0
SUM OF ALL RESPONSES TO PHQ QUESTIONS 1-9: 0
1. LITTLE INTEREST OR PLEASURE IN DOING THINGS: 0

## 2020-08-13 NOTE — PROGRESS NOTES
20  Name: aKleb Schmitt    : 1945    Sex: male    Age: 75 y.o. Subjective:  Chief Complaint: Patient is here for 6 mo ck  Re   bp chol lab  palvix     Feel ok  No  Cp or sob   Wt up  4 lbs   Stress with  [employer] school karlos dying  Has new elex wc    Spasticity uncahnged   Lab ntoe an dok     Doing the gardening  Pt dc  pepcid     dwhen it was back orderd and fien without  It 5 mo      Review of Systems   Constitutional: Negative. HENT: Negative. Eyes: Negative. Respiratory: Negative. Cardiovascular: Negative. Gastrointestinal: Negative. Endocrine: Negative. Genitourinary: Negative. Musculoskeletal: Positive for arthralgias. Skin: Negative. Allergic/Immunologic: Negative. Neurological:        Spasticity    Hematological: Negative. Psychiatric/Behavioral: Negative.           Current Outpatient Medications:     losartan (COZAAR) 25 MG tablet, Take 1 tablet by mouth every morning, Disp: 90 tablet, Rfl: 5    metoprolol succinate (TOPROL XL) 50 MG extended release tablet, Take 1 tablet by mouth 2 times daily, Disp: 90 tablet, Rfl: 5    clopidogrel (PLAVIX) 75 MG tablet, Take 1 tablet by mouth daily, Disp: 90 tablet, Rfl: 5    FLUoxetine (PROZAC) 20 MG capsule, Take 1 capsule by mouth 2 times daily, Disp: 180 capsule, Rfl: 5    vitamin B-12 (CYANOCOBALAMIN) 1000 MCG tablet, Take 1,000 mcg by mouth daily, Disp: , Rfl:     Cholecalciferol (VITAMIN D3) 2000 units CAPS, Take 1 capsule by mouth daily, Disp: , Rfl:     aspirin EC 81 MG EC tablet, Take 81 mg by mouth daily, Disp: , Rfl:   Allergies   Allergen Reactions    Lidocaine     Lodine [Etodolac]     Rofecoxib      Social History     Socioeconomic History    Marital status:      Spouse name: Not on file    Number of children: Not on file    Years of education: Not on file    Highest education level: Not on file   Occupational History    Not on file   Social Needs    Financial resource strain: Not on 4-10--REFUSED TO SE PALIDINO    ED    COLON 9-10 D R MICKY---DUE 10 YRS    SYNCOPE 8-17 [facility]---POSS TIA    ST E [id number]    EVAL DR VINES 10-17 WITH ECHO WITH PFO OR ASD AND JSUT CONT SAME WITH    ASA---PLAN RE CK ONE YEAR    EVAL DR ADAMS NEURO SAID NOT SURE IF TIA AND SEE ONE YEAR---10-17    EVAL CARDIO [facility] DR BELTRE-----3-18    THIRD POSS TIA 3-18--ADDED PLAVIX 4-18    HAS ELECTRIC CHAIR AND VAN    WIFE DEMENTIA 2-19 REFUSES TO Kika Ramirez          Past Medical History:   Diagnosis Date    Atrial septal defect     BPH (benign prostatic hyperplasia)     Cardiac arrhythmia     Conduction disorder of the heart     Degenerative joint disease involving multiple joints     Depression     Erectile dysfunction     Gastritis     GERD (gastroesophageal reflux disease)     Hyperlipidemia     Hypertension     Neurocardiogenic syncope     Neuropathic pain of both legs     Orthostatic hypotension     Osteoporosis     Ruptured ear drum, left     Spastic hemiplegic cerebral palsy (HCC)     Syncope      No family history on file. Past Surgical History:   Procedure Laterality Date    CARPAL TUNNEL RELEASE Left     ECHOCARDIOGRAM COMPLETE 2D W DOPPLER W COLOR  10/31/2012         INGUINAL HERNIA REPAIR        Vitals:    08/13/20 1108   BP: 128/82   Temp: 97.7 °F (36.5 °C)   TempSrc: Temporal   Weight: 212 lb (96.2 kg)  Comment: pt reported       Objective:    Physical Exam  Vitals signs reviewed. Constitutional:       Appearance: He is well-developed. HENT:      Head: Normocephalic. Eyes:      Pupils: Pupils are equal, round, and reactive to light. Neck:      Musculoskeletal: Normal range of motion. Cardiovascular:      Rate and Rhythm: Normal rate and regular rhythm. Pulmonary:      Effort: Pulmonary effort is normal.      Breath sounds: Normal breath sounds. Abdominal:      Palpations: Abdomen is soft.    Musculoskeletal:      Comments: Dec rom both knees  Spasticity  All muscles Skin:     General: Skin is warm. Neurological:      Mental Status: He is alert and oriented to person, place, and time. Psychiatric:         Behavior: Behavior normal.         Poornima Krueger was seen today for discuss labs. Diagnoses and all orders for this visit:    Essential hypertension    Spastic hemiplegic cerebral palsy (HCC)    Primary osteoarthritis involving multiple joints    Orthostatic hypotension    Mixed hyperlipidemia    Anxiety        Comments: low fat and sguar diet  Hm issues lose wt  exer   rom e xer taguht   strss with wife   Offer pt and he ref     Get up slowly       lwo fat   anx well cotnrolled   A great deal of time spent reviewing medications, diet, exercise, social issues. Also reviewing the chart before entering the room with patient and finishing charting after leaving patient's room. More than half of that time was spent face to face with the patient in counseling and coordinating care. Follow Up: Return in about 6 months (around 2/13/2021) for Lab Before.      Seen by:  Les Galindo DO

## 2021-01-26 DIAGNOSIS — I10 ESSENTIAL HYPERTENSION: ICD-10-CM

## 2021-01-27 RX ORDER — METOPROLOL SUCCINATE 50 MG/1
50 TABLET, EXTENDED RELEASE ORAL 2 TIMES DAILY
Qty: 180 TABLET | Refills: 5 | Status: SHIPPED
Start: 2021-01-27 | End: 2021-03-04 | Stop reason: SDUPTHER

## 2021-02-04 DIAGNOSIS — I10 ESSENTIAL HYPERTENSION: Chronic | ICD-10-CM

## 2021-02-04 DIAGNOSIS — E78.2 MIXED HYPERLIPIDEMIA: Chronic | ICD-10-CM

## 2021-02-04 DIAGNOSIS — Z12.5 PROSTATE CANCER SCREENING: ICD-10-CM

## 2021-02-04 DIAGNOSIS — E03.9 ACQUIRED HYPOTHYROIDISM: ICD-10-CM

## 2021-02-04 DIAGNOSIS — R73.03 PRE-DIABETES: ICD-10-CM

## 2021-02-04 DIAGNOSIS — M81.0 AGE-RELATED OSTEOPOROSIS WITHOUT CURRENT PATHOLOGICAL FRACTURE: ICD-10-CM

## 2021-02-04 LAB
ALBUMIN SERPL-MCNC: 4 G/DL (ref 3.5–5.2)
ALP BLD-CCNC: 75 U/L (ref 40–129)
ALT SERPL-CCNC: 8 U/L (ref 0–40)
ANION GAP SERPL CALCULATED.3IONS-SCNC: 12 MMOL/L (ref 7–16)
AST SERPL-CCNC: 16 U/L (ref 0–39)
BACTERIA: ABNORMAL /HPF
BASOPHILS ABSOLUTE: 0.05 E9/L (ref 0–0.2)
BASOPHILS RELATIVE PERCENT: 0.9 % (ref 0–2)
BILIRUB SERPL-MCNC: 0.5 MG/DL (ref 0–1.2)
BILIRUBIN URINE: NEGATIVE
BLOOD, URINE: ABNORMAL
BUN BLDV-MCNC: 16 MG/DL (ref 8–23)
CALCIUM SERPL-MCNC: 9.3 MG/DL (ref 8.6–10.2)
CHLORIDE BLD-SCNC: 102 MMOL/L (ref 98–107)
CHOLESTEROL, TOTAL: 130 MG/DL (ref 0–199)
CLARITY: CLEAR
CO2: 24 MMOL/L (ref 22–29)
COLOR: YELLOW
CREAT SERPL-MCNC: 0.8 MG/DL (ref 0.7–1.2)
EOSINOPHILS ABSOLUTE: 0.18 E9/L (ref 0.05–0.5)
EOSINOPHILS RELATIVE PERCENT: 3.2 % (ref 0–6)
GFR AFRICAN AMERICAN: >60
GFR NON-AFRICAN AMERICAN: >60 ML/MIN/1.73
GLUCOSE BLD-MCNC: 102 MG/DL (ref 74–99)
GLUCOSE URINE: NEGATIVE MG/DL
HBA1C MFR BLD: 5.2 % (ref 4–5.6)
HCT VFR BLD CALC: 43.5 % (ref 37–54)
HDLC SERPL-MCNC: 43 MG/DL
HEMOGLOBIN: 13.6 G/DL (ref 12.5–16.5)
IMMATURE GRANULOCYTES #: 0.03 E9/L
IMMATURE GRANULOCYTES %: 0.5 % (ref 0–5)
KETONES, URINE: NEGATIVE MG/DL
LDL CHOLESTEROL CALCULATED: 65 MG/DL (ref 0–99)
LEUKOCYTE ESTERASE, URINE: NEGATIVE
LYMPHOCYTES ABSOLUTE: 1.51 E9/L (ref 1.5–4)
LYMPHOCYTES RELATIVE PERCENT: 26.5 % (ref 20–42)
MCH RBC QN AUTO: 30.4 PG (ref 26–35)
MCHC RBC AUTO-ENTMCNC: 31.3 % (ref 32–34.5)
MCV RBC AUTO: 97.3 FL (ref 80–99.9)
MONOCYTES ABSOLUTE: 0.55 E9/L (ref 0.1–0.95)
MONOCYTES RELATIVE PERCENT: 9.7 % (ref 2–12)
NEUTROPHILS ABSOLUTE: 3.37 E9/L (ref 1.8–7.3)
NEUTROPHILS RELATIVE PERCENT: 59.2 % (ref 43–80)
NITRITE, URINE: NEGATIVE
PDW BLD-RTO: 13.1 FL (ref 11.5–15)
PH UA: 6 (ref 5–9)
PLATELET # BLD: 226 E9/L (ref 130–450)
PMV BLD AUTO: 9.1 FL (ref 7–12)
POTASSIUM SERPL-SCNC: 4 MMOL/L (ref 3.5–5)
PROSTATE SPECIFIC ANTIGEN: 1.97 NG/ML (ref 0–4)
PROTEIN UA: NEGATIVE MG/DL
RBC # BLD: 4.47 E12/L (ref 3.8–5.8)
RBC UA: ABNORMAL /HPF (ref 0–2)
SODIUM BLD-SCNC: 138 MMOL/L (ref 132–146)
SPECIFIC GRAVITY UA: 1.02 (ref 1–1.03)
T4 TOTAL: 4.9 MCG/DL (ref 4.5–11.7)
TOTAL PROTEIN: 6.8 G/DL (ref 6.4–8.3)
TRIGL SERPL-MCNC: 110 MG/DL (ref 0–149)
TSH SERPL DL<=0.05 MIU/L-ACNC: 1.94 UIU/ML (ref 0.27–4.2)
UROBILINOGEN, URINE: 0.2 E.U./DL
VLDLC SERPL CALC-MCNC: 22 MG/DL
WBC # BLD: 5.7 E9/L (ref 4.5–11.5)
WBC UA: ABNORMAL /HPF (ref 0–5)

## 2021-02-05 LAB — VITAMIN D 25-HYDROXY: 74 NG/ML (ref 30–100)

## 2021-03-04 ENCOUNTER — OFFICE VISIT (OUTPATIENT)
Dept: PRIMARY CARE CLINIC | Age: 76
End: 2021-03-04
Payer: MEDICARE

## 2021-03-04 VITALS — DIASTOLIC BLOOD PRESSURE: 78 MMHG | TEMPERATURE: 98.5 F | SYSTOLIC BLOOD PRESSURE: 128 MMHG

## 2021-03-04 DIAGNOSIS — F41.9 ANXIETY: ICD-10-CM

## 2021-03-04 DIAGNOSIS — I10 ESSENTIAL HYPERTENSION: Chronic | ICD-10-CM

## 2021-03-04 DIAGNOSIS — M15.9 PRIMARY OSTEOARTHRITIS INVOLVING MULTIPLE JOINTS: Chronic | ICD-10-CM

## 2021-03-04 DIAGNOSIS — G80.2 SPASTIC HEMIPLEGIC CEREBRAL PALSY (HCC): Chronic | ICD-10-CM

## 2021-03-04 DIAGNOSIS — Z00.00 ROUTINE GENERAL MEDICAL EXAMINATION AT A HEALTH CARE FACILITY: Primary | ICD-10-CM

## 2021-03-04 DIAGNOSIS — E78.2 MIXED HYPERLIPIDEMIA: Chronic | ICD-10-CM

## 2021-03-04 DIAGNOSIS — R73.03 PRE-DIABETES: ICD-10-CM

## 2021-03-04 PROCEDURE — G0439 PPPS, SUBSEQ VISIT: HCPCS | Performed by: FAMILY MEDICINE

## 2021-03-04 RX ORDER — CLOPIDOGREL BISULFATE 75 MG/1
75 TABLET ORAL DAILY
Qty: 90 TABLET | Refills: 5 | Status: SHIPPED
Start: 2021-03-04 | End: 2022-03-08 | Stop reason: SDUPTHER

## 2021-03-04 RX ORDER — LOSARTAN POTASSIUM 25 MG/1
25 TABLET ORAL EVERY MORNING
Qty: 90 TABLET | Refills: 5 | Status: SHIPPED
Start: 2021-03-04 | End: 2021-09-21 | Stop reason: SDUPTHER

## 2021-03-04 RX ORDER — FLUOXETINE HYDROCHLORIDE 20 MG/1
20 CAPSULE ORAL 2 TIMES DAILY
Qty: 180 CAPSULE | Refills: 5 | Status: SHIPPED
Start: 2021-03-04 | End: 2021-04-12 | Stop reason: SDUPTHER

## 2021-03-04 RX ORDER — METOPROLOL SUCCINATE 50 MG/1
50 TABLET, EXTENDED RELEASE ORAL 2 TIMES DAILY
Qty: 180 TABLET | Refills: 5 | Status: SHIPPED
Start: 2021-03-04 | End: 2022-02-07 | Stop reason: SDUPTHER

## 2021-03-04 ASSESSMENT — PATIENT HEALTH QUESTIONNAIRE - PHQ9
SUM OF ALL RESPONSES TO PHQ QUESTIONS 1-9: 0
2. FEELING DOWN, DEPRESSED OR HOPELESS: 0

## 2021-03-04 ASSESSMENT — LIFESTYLE VARIABLES
AUDIT TOTAL SCORE: INCOMPLETE
AUDIT-C TOTAL SCORE: INCOMPLETE
HOW OFTEN DO YOU HAVE A DRINK CONTAINING ALCOHOL: NEVER

## 2021-03-04 NOTE — PROGRESS NOTES
Medicare Annual Wellness Visit  Name: Sofia Cancer Date: 3/4/2021   MRN: [id number] Sex: Male   Age: 67 y.o. Ethnicity: Non-/Non    : 1945 Race: Martha Estrella is here for Medicare AWV, Discuss Labs, Hypertension, and Hyperlipidemia      6 mo ck  Re     bp   Chol   wc    Pt states feels great    No cp rosob  [place]  Drove here     Lab   Good        Screenings for behavioral, psychosocial and functional/safety risks, and cognitive dysfunction are all negative except as indicated below. These results, as well as other patient data from the [employer] [address] form, are documented in Flowsheets linked to this Encounter. Allergies   Allergen Reactions    Lidocaine     Lodine [Etodolac]     Rofecoxib          Prior to Visit Medications    Medication Sig Taking?  Authorizing Provider   metoprolol succinate (TOPROL XL) 50 MG extended release tablet Take 1 tablet by mouth 2 times daily Yes Salvatore Robertson DO   losartan (COZAAR) 25 MG tablet Take 1 tablet by mouth every morning Yes Salvatore Robertson DO   clopidogrel (PLAVIX) 75 MG tablet Take 1 tablet by mouth daily Yes Salvatore Robertson DO   FLUoxetine (PROZAC) 20 MG capsule Take 1 capsule by mouth 2 times daily Yes Salvatore Robertson DO   vitamin B-12 (CYANOCOBALAMIN) 1000 MCG tablet Take 1,000 mcg by mouth daily  Historical Provider, MD   Cholecalciferol (VITAMIN D3) 2000 units CAPS Take 1 capsule by mouth daily  Historical Provider, MD   aspirin EC 81 MG EC tablet Take 81 mg by mouth daily  Historical Provider, MD         Past Medical History:   Diagnosis Date    Atrial septal defect     BPH (benign prostatic hyperplasia)     Cardiac arrhythmia     Conduction disorder of the heart     Degenerative joint disease involving multiple joints     Depression     Erectile dysfunction     Gastritis     GERD (gastroesophageal reflux disease)     Hyperlipidemia     Hypertension     Neurocardiogenic syncope     Neuropathic pain of both legs     Orthostatic hypotension     Osteoporosis     Ruptured ear drum, left     Spastic hemiplegic cerebral palsy (HCC)     Syncope        Past Surgical History:   Procedure Laterality Date    CARPAL TUNNEL RELEASE Left     ECHOCARDIOGRAM COMPLETE 2D W DOPPLER W COLOR  10/31/2012         INGUINAL HERNIA REPAIR         No family history on file. CareTeam (Including outside providers/suppliers regularly involved in providing care):   Patient Care Team:  Jairo Jones DO as PCP - General (Family Medicine)  Jairo Jones DO as PCP - [facility] Empaneled Provider  Tiffanie Dorado MD as Consulting Physician (Cardiology)    Wt Readings from Last 3 Encounters:   08/13/20 212 lb (96.2 kg)   02/13/20 208 lb (94.3 kg)   06/11/19 230 lb (104.3 kg)     Vitals:    03/04/21 1233   BP: 128/78   Temp: 98.5 °F (36.9 °C)   TempSrc: Oral     There is no height or weight on file to calculate BMI. Based upon direct observation of the patient, evaluation of cognition reveals recent and remote memory intact.     General Appearance: alert and oriented to person, place and time, well developed and well- nourished, in no acute distress  Skin: warm and dry, no rash or erythema  Head: normocephalic and atraumatic  Eyes: pupils equal, round, and reactive to light, extraocular eye movements intact, conjunctivae normal  ENT: tympanic membrane, external ear and ear canal normal bilaterally, nose without deformity, nasal mucosa and turbinates normal without polyps  Neck: supple and non-tender without mass, no thyromegaly or thyroid nodules, no cervical lymphadenopathy  Pulmonary/Chest: clear to auscultation bilaterally- no wheezes, rales or rhonchi, normal air movement, no respiratory distress  Cardiovascular: normal rate, regular rhythm, normal S1 and S2, no murmurs, rubs, clicks, or gallops, distal pulses intact, no carotid bruits  Abdomen: soft, non-tender, non-distended, normal bowel sounds, no masses or organomegaly  Extremities: no cyanosis, clubbing or edema  Musculoskeletal: normal range of motion, no joint swelling, deformity or tenderness  Neurologic: reflexes normal and symmetric, no cranial nerve deficit, gait, coordination and speech normal    Patient's complete Health Risk Assessment and screening values have been reviewed and are found in Flowsheets. The following problems were reviewed today and where indicated follow up appointments were made and/or referrals ordered. Positive Risk Factor Screenings with Interventions:            General Health and ACP:  General  In general, how would you say your health is?: Good  In the past 7 days, have you experienced any of the following? New or Increased Pain, New or Increased Fatigue, Loneliness, Social Isolation, Stress or Anger?: None of These  Do you get the social and emotional support that you need?: Yes  Do you have a Living Will?: Yes  Advance Directives     Power of [facility] Will ACP-Advance Directive ACP-Power of     Not on File Not on File Not on File Not on File      General Health Risk Interventions:  · none    Health Habits/Nutrition:  Health Habits/Nutrition  Do you exercise for at least 20 minutes 2-3 times per week?: (!) No  Have you lost any weight without trying in the past 3 months?: No  Do you eat only one meal per day?: No  Have you seen the dentist within the past year?: Yes     Health Habits/Nutrition Interventions:  · Inadequate physical activity:  patient agrees to exercise for at least 150 minutes/week      ADL:  ADLs  In the past 7 days, did you need help from others to perform any of the following everyday activities? Eating, dressing, grooming, bathing, toileting, or walking/balance?: (!) Walking/Balance, None  In the past 7 days, did you need help from others to take care of any of the following?  Laundry, housekeeping, banking/finances, shopping, telephone use, food preparation, transportation, or taking medications?: None  ADL Interventions:  · Patient declines any further evaluation/treatment for this issue    Personalized Preventive Plan   Current Health Maintenance Status  Immunization History   Administered Date(s) Administered    Influenza Vaccine, unspecified formulation 08/27/2012    Influenza Virus Vaccine 08/27/2012    Influenza, High Dose (Fluzone 65 yrs and older) 11/11/2013, 10/30/2015, 10/13/2016, 09/26/2017, 10/09/2018, 09/11/2019, 09/28/2020    Pneumococcal Conjugate 13-valent Iona Shearer) 09/26/2017        Health Maintenance   Topic Date Due    Hepatitis C screen  Never done    COVID-19 Vaccine (1 of 2) Never done    DTaP/Tdap/Td vaccine (1 - Tdap) Never done    Shingles Vaccine (1 of 2) Never done    Pneumococcal 65+ years Vaccine (2 of 2 - PPSV23) 09/26/2018    Annual Wellness Visit (AWV)  Never done    Potassium monitoring  02/04/2022    Creatinine monitoring  02/04/2022    Flu vaccine  Completed    Hepatitis A vaccine  Aged Out    Hepatitis B vaccine  Aged Out    Hib vaccine  Aged Out    Meningococcal (ACWY) vaccine  Aged Out     Recommendations for [employer] Due: see orders and patient instructions/AVS.  . Recommended screening schedule for the next 5-10 years is provided to the patient in written form: see Patient Instructions/AVS.    Catalina Hartmann was seen today for medicare awv, discuss labs, hypertension and hyperlipidemia. Diagnoses and all orders for this visit:    Routine general medical examination at a health care facility    Essential hypertension  -     metoprolol succinate (TOPROL XL) 50 MG extended release tablet; Take 1 tablet by mouth 2 times daily  -     losartan (COZAAR) 25 MG tablet; Take 1 tablet by mouth every morning  -     clopidogrel (PLAVIX) 75 MG tablet;  Take 1 tablet by mouth daily    Mixed hyperlipidemia    Spastic hemiplegic cerebral palsy (HCC)    Primary osteoarthritis involving multiple joints    Anxiety  -     FLUoxetine (PROZAC) 20 MG capsule; Take 1 capsule by mouth 2 times daily             Diet exer hm isuse    Lose wt   Low  Carb diet   Ck bp dily    A great deal of time spent reviewing medications, diet, exercise, social issues. Also reviewing the chart before entering the room with patient and finishing charting after leaving patient's room. More than half of that time was spent face to face with the patient in counseling and coordinating care.

## 2021-03-04 NOTE — PATIENT INSTRUCTIONS
Personalized Preventive Plan for [facility] - 3/4/2021  Medicare offers a range of preventive health benefits. Some of the tests and screenings are paid in full while other may be subject to a deductible, co-insurance, and/or copay. Some of these benefits include a comprehensive review of your medical history including lifestyle, illnesses that may run in your family, and various assessments and screenings as appropriate. After reviewing your medical record and screening and assessments performed today your provider may have ordered immunizations, labs, imaging, and/or referrals for you. A list of these orders (if applicable) as well as your Preventive Care list are included within your After Visit Summary for your review. Other Preventive Recommendations:    · A preventive eye exam performed by an eye specialist is recommended every 1-2 years to screen for glaucoma; cataracts, macular degeneration, and other eye disorders. · A preventive dental visit is recommended every 6 months. · Try to get at least 150 minutes of exercise per week or 10,000 steps per day on a pedometer . · Order or download the FREE \"Exercise & Physical Activity: Your Everyday Guide\" from [employer]. Call 3-265.660.2791 or search [employer] online. · You need 5988-6797 mg of calcium and 6688-5121 IU of vitamin D per day. It is possible to meet your calcium requirement with diet alone, but a vitamin D supplement is usually necessary to meet this goal.  · When exposed to the sun, use a sunscreen that protects against both UVA and UVB radiation with an SPF of 30 or greater. Reapply every 2 to 3 hours or after sweating, drying off with a towel, or swimming. · Always wear a seat belt when traveling in a car. Always wear a helmet when riding a bicycle or motorcycle.

## 2021-04-12 DIAGNOSIS — F41.9 ANXIETY: ICD-10-CM

## 2021-04-12 RX ORDER — FLUOXETINE HYDROCHLORIDE 20 MG/1
20 CAPSULE ORAL 2 TIMES DAILY
Qty: 180 CAPSULE | Refills: 5 | Status: SHIPPED
Start: 2021-04-12 | End: 2022-03-08 | Stop reason: SDUPTHER

## 2021-09-07 ENCOUNTER — OFFICE VISIT (OUTPATIENT)
Dept: PRIMARY CARE CLINIC | Age: 76
End: 2021-09-07
Payer: MEDICARE

## 2021-09-07 VITALS — SYSTOLIC BLOOD PRESSURE: 126 MMHG | TEMPERATURE: 98.1 F | DIASTOLIC BLOOD PRESSURE: 78 MMHG

## 2021-09-07 DIAGNOSIS — M81.0 AGE-RELATED OSTEOPOROSIS WITHOUT CURRENT PATHOLOGICAL FRACTURE: ICD-10-CM

## 2021-09-07 DIAGNOSIS — F41.9 ANXIETY: Chronic | ICD-10-CM

## 2021-09-07 DIAGNOSIS — M15.9 PRIMARY OSTEOARTHRITIS INVOLVING MULTIPLE JOINTS: Primary | Chronic | ICD-10-CM

## 2021-09-07 DIAGNOSIS — E03.9 ACQUIRED HYPOTHYROIDISM: ICD-10-CM

## 2021-09-07 DIAGNOSIS — E78.2 MIXED HYPERLIPIDEMIA: Chronic | ICD-10-CM

## 2021-09-07 DIAGNOSIS — Z12.5 PROSTATE CANCER SCREENING: ICD-10-CM

## 2021-09-07 DIAGNOSIS — I10 ESSENTIAL HYPERTENSION: Chronic | ICD-10-CM

## 2021-09-07 DIAGNOSIS — G80.2 SPASTIC HEMIPLEGIC CEREBRAL PALSY (HCC): Chronic | ICD-10-CM

## 2021-09-07 PROCEDURE — 99214 OFFICE O/P EST MOD 30 MIN: CPT | Performed by: FAMILY MEDICINE

## 2021-09-07 ASSESSMENT — ENCOUNTER SYMPTOMS
EYES NEGATIVE: 1
RESPIRATORY NEGATIVE: 1
ALLERGIC/IMMUNOLOGIC NEGATIVE: 1
GASTROINTESTINAL NEGATIVE: 1

## 2021-09-07 ASSESSMENT — PATIENT HEALTH QUESTIONNAIRE - PHQ9
2. FEELING DOWN, DEPRESSED OR HOPELESS: 0
SUM OF ALL RESPONSES TO PHQ QUESTIONS 1-9: 0
1. LITTLE INTEREST OR PLEASURE IN DOING THINGS: 0
SUM OF ALL RESPONSES TO PHQ QUESTIONS 1-9: 0
SUM OF ALL RESPONSES TO PHQ9 QUESTIONS 1 & 2: 0
SUM OF ALL RESPONSES TO PHQ QUESTIONS 1-9: 0

## 2021-09-07 NOTE — PROGRESS NOTES
Cigarettes     Start date: 1959     Quit date: 1965     Years since quittin.5    Smokeless tobacco: Never Used   Substance and Sexual Activity    Alcohol use: No    Drug use: No    Sexual activity: Not on file   Other Topics Concern    Not on file   Social History Narrative        CARDIAC [address]    GERD    DISABILITY FOR CRUSHED FEET/LOW BACK/ L HAND----    GASTRITIS    H PYLORI    ORTHOSTATIC HYPOTENSION  POS TTT PALLIDINO    NEUROCARDIOGENIC SYNCOPE    SMOKER QUIT 25 YRS AGO    HX L CTS OR    Delene Henry  1945 [address]    [employer] L ING HERNIA OR    L ANKEL FUSION    BPH WITH ELEV PSA---BX    ED    OSTEOPOROSIS    RUPTURED L EAR DRUM    SMALL R RENAL DIS    FALL  OFF ROOF    FX L FERUR 3-07    NEUROPATHY LEGS--VIT-B-12 MONTHLY    ONE CHILD WITH WIFE (BOY)----  AT 37 WITH KIDNEY FAILURE----- AND SHE HAS 7    OTHER CHILDREN    WIFE  09 ON PT BIRTHDAY WITH CA THAT SHE IGNORED--BREAST--SHE 9 YRS OLDER     --SHE 6 YRS OLDER    SYNCOPE 4-10--REFUSED TO SE PALIDINO    ED    COLON 9-10 D R [place]---DUE 10 YRS    SYNCOPE  [facility]---POSS TIA    ST E [id number]    EVAL DR VINES 10-17 WITH ECHO WITH PFO OR ASD AND JSUT CONT SAME WITH    ASA---PLAN RE CK ONE YEAR    EVAL DR ADAMS NEURO SAID NOT SURE IF TIA AND SEE ONE YEAR---10-17    EVAL CARDIO [facility] DR BELTRE-----3-18    THIRD POSS TIA 3-18--ADDED PLAVIX 18    HAS ELECTRIC CHAIR AND VAN    WIFE DEMENTIA 2-19 REFUSES TO LEAVE HOUSE    Step daughter   at 3   2-21    Covid  7-21    even with moderna vaccine     Social Determinants of Health     Financial Resource Strain:     Difficulty of Paying Living Expenses:    Food Insecurity:     Worried About Running Out of Food in the Last Year:     Ran Out of Food in the Last Year:    Transportation Needs:     Lack of Transportation (Medical):      Lack of Transportation (Non-Medical):    Physical Activity:     Days of Exercise per Week:     Minutes of Exercise per Session:    Stress:     Feeling of Stress :    Social Connections:     Frequency of Communication with Friends and Family:     Frequency of Social Gatherings with Friends and Family:     Attends Holiness Services:     Active Member of Clubs or Organizations:     Attends Club or Organization Meetings:     Marital Status:    Intimate Partner Violence:     Fear of Current or Ex-Partner:     Emotionally Abused:     Physically Abused:     Sexually Abused:       Past Medical History:   Diagnosis Date    Atrial septal defect     BPH (benign prostatic hyperplasia)     Cardiac arrhythmia     Conduction disorder of the heart     Degenerative joint disease involving multiple joints     Depression     Erectile dysfunction     Gastritis     GERD (gastroesophageal reflux disease)     Hyperlipidemia     Hypertension     Neurocardiogenic syncope     Neuropathic pain of both legs     Orthostatic hypotension     Osteoporosis     Ruptured ear drum, left     Spastic hemiplegic cerebral palsy ([address]     Syncope      No family history on file. Past Surgical History:   Procedure Laterality Date    CARPAL TUNNEL RELEASE Left     ECHOCARDIOGRAM COMPLETE 2D W DOPPLER W COLOR  10/31/2012         INGUINAL HERNIA REPAIR        Vitals:    09/07/21 1503   BP: 126/78   Temp: 98.1 °F (36.7 °C)   TempSrc: Oral   Weight: Comment: wheelchair       Objective:    Physical Exam  Vitals reviewed. Constitutional:       Appearance: He is well-developed. HENT:      Head: Normocephalic. Eyes:      Pupils: Pupils are equal, round, and reactive to light. Cardiovascular:      Rate and Rhythm: Normal rate and regular rhythm. Pulmonary:      Effort: Pulmonary effort is normal.      Breath sounds: Normal breath sounds. Abdominal:      Palpations: Abdomen is soft. Musculoskeletal:      Cervical back: Normal range of motion.       Comments: Marked wekaness lower extrem Skin:     General: Skin is warm. Neurological:      Mental Status: He is alert and oriented to person, place, and time. Psychiatric:         Behavior: Behavior normal.         Sadaf Carias was seen today for discuss labs. Diagnoses and all orders for this visit:    Primary osteoarthritis involving multiple joints    Essential hypertension    Mixed hyperlipidemia    Spastic hemiplegic cerebral palsy (HCC)    Anxiety        Comments: low fat and sugar diet   Hm isuses     Lose wt  exer  rom exer ta ught         Check blood pressure at home twice a day. Low-salt low caffeine diet. Call if systolic blood pressure is above 871 and diastolic blood pressures above 85. Only use a upper arm digital cuff. Aggressive low-fat diet. Avoid red meats, greasy fried foods, dairy products. Avoid processed foods. Take cholesterol medications without food. A great deal of time spent reviewing medications, diet, exercise, social issues. Also reviewing the chart before entering the room with patient and finishing charting after leaving patient's room. More than half of that time was spent face to face with the patient in counseling and coordinating care. Follow Up: Return in about 6 months (around 3/7/2022) for Lab Before.      Seen by:  Eladio Poole DO

## 2021-09-21 DIAGNOSIS — I10 ESSENTIAL HYPERTENSION: Chronic | ICD-10-CM

## 2021-09-21 RX ORDER — LOSARTAN POTASSIUM 25 MG/1
25 TABLET ORAL EVERY MORNING
Qty: 90 TABLET | Refills: 5 | Status: SHIPPED
Start: 2021-09-21 | End: 2022-03-08 | Stop reason: SDUPTHER

## 2022-02-07 DIAGNOSIS — I10 ESSENTIAL HYPERTENSION: Chronic | ICD-10-CM

## 2022-02-07 RX ORDER — METOPROLOL SUCCINATE 50 MG/1
50 TABLET, EXTENDED RELEASE ORAL 2 TIMES DAILY
Qty: 180 TABLET | Refills: 5 | Status: SHIPPED
Start: 2022-02-07 | End: 2022-03-08 | Stop reason: SDUPTHER

## 2022-02-18 DIAGNOSIS — M81.0 AGE-RELATED OSTEOPOROSIS WITHOUT CURRENT PATHOLOGICAL FRACTURE: ICD-10-CM

## 2022-02-18 DIAGNOSIS — I10 ESSENTIAL HYPERTENSION: Chronic | ICD-10-CM

## 2022-02-18 DIAGNOSIS — E78.2 MIXED HYPERLIPIDEMIA: Chronic | ICD-10-CM

## 2022-02-18 DIAGNOSIS — E03.9 ACQUIRED HYPOTHYROIDISM: ICD-10-CM

## 2022-02-18 DIAGNOSIS — Z12.5 PROSTATE CANCER SCREENING: ICD-10-CM

## 2022-02-18 LAB
ALBUMIN SERPL-MCNC: 4 G/DL (ref 3.5–5.2)
ALP BLD-CCNC: 85 U/L (ref 40–129)
ALT SERPL-CCNC: 9 U/L (ref 0–40)
ANION GAP SERPL CALCULATED.3IONS-SCNC: 9 MMOL/L (ref 7–16)
AST SERPL-CCNC: 16 U/L (ref 0–39)
BASOPHILS ABSOLUTE: 0.03 E9/L (ref 0–0.2)
BASOPHILS RELATIVE PERCENT: 0.6 % (ref 0–2)
BILIRUB SERPL-MCNC: 0.4 MG/DL (ref 0–1.2)
BILIRUBIN URINE: NEGATIVE
BLOOD, URINE: NEGATIVE
BUN BLDV-MCNC: 16 MG/DL (ref 6–23)
CALCIUM SERPL-MCNC: 9.1 MG/DL (ref 8.6–10.2)
CHLORIDE BLD-SCNC: 104 MMOL/L (ref 98–107)
CHOLESTEROL, TOTAL: 133 MG/DL (ref 0–199)
CLARITY: CLEAR
CO2: 24 MMOL/L (ref 22–29)
COLOR: YELLOW
CREAT SERPL-MCNC: 0.9 MG/DL (ref 0.7–1.2)
EOSINOPHILS ABSOLUTE: 0.13 E9/L (ref 0.05–0.5)
EOSINOPHILS RELATIVE PERCENT: 2.5 % (ref 0–6)
GFR AFRICAN AMERICAN: >60
GFR NON-AFRICAN AMERICAN: >60 ML/MIN/1.73
GLUCOSE BLD-MCNC: 100 MG/DL (ref 74–99)
GLUCOSE URINE: NEGATIVE MG/DL
HCT VFR BLD CALC: 41.1 % (ref 37–54)
HDLC SERPL-MCNC: 41 MG/DL
HEMOGLOBIN: 13.5 G/DL (ref 12.5–16.5)
IMMATURE GRANULOCYTES #: 0.02 E9/L
IMMATURE GRANULOCYTES %: 0.4 % (ref 0–5)
KETONES, URINE: NEGATIVE MG/DL
LDL CHOLESTEROL CALCULATED: 75 MG/DL (ref 0–99)
LEUKOCYTE ESTERASE, URINE: NEGATIVE
LYMPHOCYTES ABSOLUTE: 1.45 E9/L (ref 1.5–4)
LYMPHOCYTES RELATIVE PERCENT: 28 % (ref 20–42)
MCH RBC QN AUTO: 31.2 PG (ref 26–35)
MCHC RBC AUTO-ENTMCNC: 32.8 % (ref 32–34.5)
MCV RBC AUTO: 94.9 FL (ref 80–99.9)
MONOCYTES ABSOLUTE: 0.53 E9/L (ref 0.1–0.95)
MONOCYTES RELATIVE PERCENT: 10.3 % (ref 2–12)
NEUTROPHILS ABSOLUTE: 3.01 E9/L (ref 1.8–7.3)
NEUTROPHILS RELATIVE PERCENT: 58.2 % (ref 43–80)
NITRITE, URINE: NEGATIVE
PDW BLD-RTO: 13.1 FL (ref 11.5–15)
PH UA: 7.5 (ref 5–9)
PLATELET # BLD: 221 E9/L (ref 130–450)
PMV BLD AUTO: 9.5 FL (ref 7–12)
POTASSIUM SERPL-SCNC: 4.3 MMOL/L (ref 3.5–5)
PROSTATE SPECIFIC ANTIGEN: 2.34 NG/ML (ref 0–4)
PROTEIN UA: NEGATIVE MG/DL
RBC # BLD: 4.33 E12/L (ref 3.8–5.8)
SODIUM BLD-SCNC: 137 MMOL/L (ref 132–146)
SPECIFIC GRAVITY UA: 1.01 (ref 1–1.03)
T4 TOTAL: 5.4 MCG/DL (ref 4.5–11.7)
TOTAL PROTEIN: 7.2 G/DL (ref 6.4–8.3)
TRIGL SERPL-MCNC: 86 MG/DL (ref 0–149)
TSH SERPL DL<=0.05 MIU/L-ACNC: 2.25 UIU/ML (ref 0.27–4.2)
UROBILINOGEN, URINE: 0.2 E.U./DL
VITAMIN D 25-HYDROXY: 82 NG/ML (ref 30–100)
VLDLC SERPL CALC-MCNC: 17 MG/DL
WBC # BLD: 5.2 E9/L (ref 4.5–11.5)

## 2022-03-08 ENCOUNTER — OFFICE VISIT (OUTPATIENT)
Dept: PRIMARY CARE CLINIC | Age: 77
End: 2022-03-08
Payer: MEDICARE

## 2022-03-08 VITALS — TEMPERATURE: 96.6 F | DIASTOLIC BLOOD PRESSURE: 82 MMHG | SYSTOLIC BLOOD PRESSURE: 128 MMHG

## 2022-03-08 DIAGNOSIS — E11.9 DIET-CONTROLLED DIABETES MELLITUS (HCC): ICD-10-CM

## 2022-03-08 DIAGNOSIS — Z00.00 MEDICARE ANNUAL WELLNESS VISIT, SUBSEQUENT: Primary | ICD-10-CM

## 2022-03-08 DIAGNOSIS — G80.2 SPASTIC HEMIPLEGIC CEREBRAL PALSY (HCC): Chronic | ICD-10-CM

## 2022-03-08 DIAGNOSIS — G45.9 TIA (TRANSIENT ISCHEMIC ATTACK): ICD-10-CM

## 2022-03-08 DIAGNOSIS — M81.0 AGE-RELATED OSTEOPOROSIS WITHOUT CURRENT PATHOLOGICAL FRACTURE: ICD-10-CM

## 2022-03-08 DIAGNOSIS — F41.9 ANXIETY: ICD-10-CM

## 2022-03-08 DIAGNOSIS — I10 ESSENTIAL HYPERTENSION: Chronic | ICD-10-CM

## 2022-03-08 DIAGNOSIS — I95.1 ORTHOSTATIC HYPOTENSION: Chronic | ICD-10-CM

## 2022-03-08 PROCEDURE — G0439 PPPS, SUBSEQ VISIT: HCPCS | Performed by: FAMILY MEDICINE

## 2022-03-08 RX ORDER — CLOPIDOGREL BISULFATE 75 MG/1
75 TABLET ORAL DAILY
Qty: 90 TABLET | Refills: 5 | Status: SHIPPED | OUTPATIENT
Start: 2022-03-08

## 2022-03-08 RX ORDER — LOSARTAN POTASSIUM 25 MG/1
25 TABLET ORAL EVERY MORNING
Qty: 90 TABLET | Refills: 5 | Status: SHIPPED
Start: 2022-03-08 | End: 2022-10-03 | Stop reason: SDUPTHER

## 2022-03-08 RX ORDER — METOPROLOL SUCCINATE 50 MG/1
50 TABLET, EXTENDED RELEASE ORAL 2 TIMES DAILY
Qty: 180 TABLET | Refills: 5 | Status: SHIPPED | OUTPATIENT
Start: 2022-03-08

## 2022-03-08 RX ORDER — FLUOXETINE HYDROCHLORIDE 20 MG/1
20 CAPSULE ORAL 2 TIMES DAILY
Qty: 180 CAPSULE | Refills: 5 | Status: SHIPPED | OUTPATIENT
Start: 2022-03-08

## 2022-03-08 ASSESSMENT — LIFESTYLE VARIABLES: HOW OFTEN DO YOU HAVE A DRINK CONTAINING ALCOHOL: NEVER

## 2022-03-08 ASSESSMENT — PATIENT HEALTH QUESTIONNAIRE - PHQ9
2. FEELING DOWN, DEPRESSED OR HOPELESS: 0
SUM OF ALL RESPONSES TO PHQ9 QUESTIONS 1 & 2: 0
SUM OF ALL RESPONSES TO PHQ QUESTIONS 1-9: 0
1. LITTLE INTEREST OR PLEASURE IN DOING THINGS: 0

## 2022-03-08 NOTE — PATIENT INSTRUCTIONS
Personalized Preventive Plan for Ghada Davis - 3/8/2022  Medicare offers a range of preventive health benefits. Some of the tests and screenings are paid in full while other may be subject to a deductible, co-insurance, and/or copay. Some of these benefits include a comprehensive review of your medical history including lifestyle, illnesses that may run in your family, and various assessments and screenings as appropriate. After reviewing your medical record and screening and assessments performed today your provider may have ordered immunizations, labs, imaging, and/or referrals for you. A list of these orders (if applicable) as well as your Preventive Care list are included within your After Visit Summary for your review. Other Preventive Recommendations:    · A preventive eye exam performed by an eye specialist is recommended every 1-2 years to screen for glaucoma; cataracts, macular degeneration, and other eye disorders. · A preventive dental visit is recommended every 6 months. · Try to get at least 150 minutes of exercise per week or 10,000 steps per day on a pedometer . · Order or download the FREE \"Exercise & Physical Activity: Your Everyday Guide\" from [employer]. Call 9-828.800.5901 or search [employer] online. · You need 2899-7273 mg of calcium and 1042-0797 IU of vitamin D per day. It is possible to meet your calcium requirement with diet alone, but a vitamin D supplement is usually necessary to meet this goal.  · When exposed to the sun, use a sunscreen that protects against both UVA and UVB radiation with an SPF of 30 or greater. Reapply every 2 to 3 hours or after sweating, drying off with a towel, or swimming. · Always wear a seat belt when traveling in a car. Always wear a helmet when riding a bicycle or motorcycle.

## 2022-03-08 NOTE — PROGRESS NOTES
Medicare Annual Wellness Visit    Linda Morton is here for Medicare AWV and Discuss Labs (6m)    [address] was seen today for medicare awv and discuss labs. Diagnoses and all orders for this visit:    Medicare annual wellness visit, subsequent    Essential hypertension    Anxiety    Spastic hemiplegic cerebral palsy ([address]    Orthostatic hypotension         Recommendations for Preventive Services Due: see orders and patient instructions/AVS.  Recommended screening schedule for the next 5-10 years is provided to the patient in written form: see Patient Instructions/AVS.     Return in 6 months (on 9/8/2022) for Lab Before. Subjective   The following acute and/or chronic problems were also addressed today:  6 mo ck  Re   Chol   bp     arth  Vit  D  Cerebral palsy    Stress with wife  hosp with uti   Dec    meds  rewived        Patient's complete Health Risk Assessment and screening values have been reviewed and are found in Flowsheets. The following problems were reviewed today and where indicated follow up appointments were made and/or referrals ordered.     Positive Risk Factor Screenings with Interventions:               General Health and ACP:  General  In general, how would you say your health is?: Fair  In the past 7 days, have you experienced any of the following: New or Increased Pain, New or Increased Fatigue, Loneliness, Social Isolation, Stress or Anger?: No  Do you get the social and emotional support that you need?: Yes  Do you have a Living Will?: Yes    Advance Directives     Power of  Living Will ACP-Advance Directive ACP-Power of     Not on File Not on File Not on File Not on File      General Health Risk Interventions:  · No Living Will: Advance Care Planning addressed with patient today    Health Habits/Nutrition:     Physical Activity: Inactive    Days of Exercise per Week: 0 days    Minutes of Exercise per Session: 0 min     Have you lost any weight without trying in the past 3 months?: No     Have you seen the dentist within the past year?: Yes    Health Habits/Nutrition Interventions:  · Inadequate physical activity:  patient agrees to exercise for at least 150 minutes/week             Objective   Vitals:    03/08/22 1102   BP: 128/82   Temp: 96.6 °F (35.9 °C)   TempSrc: Infrared      There is no height or weight on file to calculate BMI. General Appearance: alert and oriented to person, place and time, well developed and well- nourished, in no acute distress  Skin: warm and dry, no rash or erythema  Head: normocephalic and atraumatic  Eyes: pupils equal, round, and reactive to light, extraocular eye movements intact, conjunctivae normal  ENT: tympanic membrane, external ear and ear canal normal bilaterally, nose without deformity, nasal mucosa and turbinates normal without polyps  Neck: supple and non-tender without mass, no thyromegaly or thyroid nodules, no cervical lymphadenopathy  Pulmonary/Chest: clear to auscultation bilaterally- no wheezes, rales or rhonchi, normal air movement, no respiratory distress  Cardiovascular: normal rate, regular rhythm, normal S1 and S2, no murmurs, rubs, clicks, or gallops, distal pulses intact, no carotid bruits  Abdomen: soft, non-tender, non-distended, normal bowel sounds, no masses or organomegaly  Extremities: no cyanosis, clubbing or edema  Musculoskeletal: spsticity  Neurologic: reflexes normal and symmetric, no cranial nerve deficit, gait, coordination and speech normal       Allergies   Allergen Reactions    Lidocaine     Lodine [Etodolac]     Rofecoxib      Prior to Visit Medications    Medication Sig Taking?  Authorizing Provider   metoprolol succinate (TOPROL XL) 50 MG extended release tablet Take 1 tablet by mouth 2 times daily  Salvatore Robertson DO   losartan (COZAAR) 25 MG tablet Take 1 tablet by mouth every morning  Salvatore Robertson DO   FLUoxetine (PROZAC) 20 MG capsule Take 1 capsule by mouth 2 times daily  Salvatore Robertson DO   clopidogrel (PLAVIX) 75 MG tablet Take 1 tablet by mouth daily  Salvatore Robertson DO   vitamin B-12 (CYANOCOBALAMIN) 1000 MCG tablet Take 1,000 mcg by mouth daily  Historical Provider, MD   Cholecalciferol (VITAMIN D3) 2000 units CAPS Take 1 capsule by mouth daily  Historical Provider, MD   aspirin EC 81 MG EC tablet Take 81 mg by mouth daily  Historical Provider, MD [place] (Including outside providers/suppliers regularly involved in providing care):   Patient Care Team:  Edna Anderson DO as PCP - General (Family Medicine)  Edna Anderson DO as PCP - [facility] Empaneled Provider  Tiara Parra MD as Consulting Physician (Cardiology)    Reviewed and updated this visit:  Tobacco  Allergies  Med Hx  Surg Hx  Soc Hx  Fam Hx               6 mo  Lab efore      Hm ssieus  lsoe wt  exer         I educated the patient about all medications. Make sure they were correct and educated  on the risk associated with missing meds or taking them incorrectly. A list of medications is being sent home with patient today. Check blood pressure at home twice a day. Low-salt low caffeine diet. Call if systolic blood pressure is above 781 and diastolic blood pressures above 85. Only use a upper arm digital cuff. Aggressive low-fat diet. Avoid red meats, greasy fried foods, dairy products. Avoid processed foods. Take cholesterol medications without food. I informed patient about the risk associated with noncompliance of medication and taking medications incorrectly. Appropriate follow-up with myself and all specialist.  Encourage family members to take active role in assisting with medications and medical care. If any confusion should develop to notify my office immediately to avoid risk of worsening medical condition    A great deal of time spent reviewing medications, diet, exercise, social issues.  Also reviewing the chart before entering the room with patient and finishing charting after leaving patient's room. More than half of that time was spent face to face with the patient in counseling and coordinating care.

## 2022-08-26 DIAGNOSIS — G45.9 TIA (TRANSIENT ISCHEMIC ATTACK): ICD-10-CM

## 2022-08-26 DIAGNOSIS — E11.9 DIET-CONTROLLED DIABETES MELLITUS (HCC): ICD-10-CM

## 2022-08-26 DIAGNOSIS — I10 ESSENTIAL HYPERTENSION: Chronic | ICD-10-CM

## 2022-08-26 DIAGNOSIS — M81.0 AGE-RELATED OSTEOPOROSIS WITHOUT CURRENT PATHOLOGICAL FRACTURE: ICD-10-CM

## 2022-08-26 LAB
ALBUMIN SERPL-MCNC: 3.7 G/DL (ref 3.5–5.2)
ALP BLD-CCNC: 90 U/L (ref 40–129)
ALT SERPL-CCNC: 7 U/L (ref 0–40)
ANION GAP SERPL CALCULATED.3IONS-SCNC: 10 MMOL/L (ref 7–16)
AST SERPL-CCNC: 13 U/L (ref 0–39)
BASOPHILS ABSOLUTE: 0.06 E9/L (ref 0–0.2)
BASOPHILS RELATIVE PERCENT: 1.2 % (ref 0–2)
BILIRUB SERPL-MCNC: 0.3 MG/DL (ref 0–1.2)
BUN BLDV-MCNC: 8 MG/DL (ref 6–23)
CALCIUM SERPL-MCNC: 9.1 MG/DL (ref 8.6–10.2)
CHLORIDE BLD-SCNC: 107 MMOL/L (ref 98–107)
CHOLESTEROL, TOTAL: 117 MG/DL (ref 0–199)
CO2: 25 MMOL/L (ref 22–29)
CREAT SERPL-MCNC: 0.8 MG/DL (ref 0.7–1.2)
EOSINOPHILS ABSOLUTE: 0.17 E9/L (ref 0.05–0.5)
EOSINOPHILS RELATIVE PERCENT: 3.4 % (ref 0–6)
GFR AFRICAN AMERICAN: >60
GFR NON-AFRICAN AMERICAN: >60 ML/MIN/1.73
GLUCOSE BLD-MCNC: 95 MG/DL (ref 74–99)
HBA1C MFR BLD: 5.2 % (ref 4–5.6)
HCT VFR BLD CALC: 39.9 % (ref 37–54)
HDLC SERPL-MCNC: 37 MG/DL
HEMOGLOBIN: 13.3 G/DL (ref 12.5–16.5)
IMMATURE GRANULOCYTES #: 0.02 E9/L
IMMATURE GRANULOCYTES %: 0.4 % (ref 0–5)
LDL CHOLESTEROL CALCULATED: 56 MG/DL (ref 0–99)
LYMPHOCYTES ABSOLUTE: 1.74 E9/L (ref 1.5–4)
LYMPHOCYTES RELATIVE PERCENT: 34.3 % (ref 20–42)
MCH RBC QN AUTO: 31.5 PG (ref 26–35)
MCHC RBC AUTO-ENTMCNC: 33.3 % (ref 32–34.5)
MCV RBC AUTO: 94.5 FL (ref 80–99.9)
MONOCYTES ABSOLUTE: 0.42 E9/L (ref 0.1–0.95)
MONOCYTES RELATIVE PERCENT: 8.3 % (ref 2–12)
NEUTROPHILS ABSOLUTE: 2.66 E9/L (ref 1.8–7.3)
NEUTROPHILS RELATIVE PERCENT: 52.4 % (ref 43–80)
PDW BLD-RTO: 13 FL (ref 11.5–15)
PLATELET # BLD: 213 E9/L (ref 130–450)
PMV BLD AUTO: 9.2 FL (ref 7–12)
POTASSIUM SERPL-SCNC: 4.2 MMOL/L (ref 3.5–5)
RBC # BLD: 4.22 E12/L (ref 3.8–5.8)
SODIUM BLD-SCNC: 142 MMOL/L (ref 132–146)
TOTAL PROTEIN: 6.6 G/DL (ref 6.4–8.3)
TRIGL SERPL-MCNC: 118 MG/DL (ref 0–149)
VLDLC SERPL CALC-MCNC: 24 MG/DL
WBC # BLD: 5.1 E9/L (ref 4.5–11.5)

## 2022-08-27 LAB — VITAMIN D 25-HYDROXY: 76 NG/ML (ref 30–100)

## 2022-08-29 ENCOUNTER — TELEPHONE (OUTPATIENT)
Dept: PRIMARY CARE CLINIC | Age: 77
End: 2022-08-29

## 2022-08-29 NOTE — TELEPHONE ENCOUNTER
Patient could not remember of he ate or was fasting when he had labs drawn last week. Concerned the results may not be accurate.   Asking for advice

## 2022-09-06 ENCOUNTER — OFFICE VISIT (OUTPATIENT)
Dept: PRIMARY CARE CLINIC | Age: 77
End: 2022-09-06
Payer: MEDICARE

## 2022-09-06 VITALS — HEIGHT: 70 IN | TEMPERATURE: 97.4 F | OXYGEN SATURATION: 98 % | HEART RATE: 54 BPM | BODY MASS INDEX: 30.42 KG/M2

## 2022-09-06 DIAGNOSIS — Z12.5 PROSTATE CANCER SCREENING: ICD-10-CM

## 2022-09-06 DIAGNOSIS — E78.2 MIXED HYPERLIPIDEMIA: Chronic | ICD-10-CM

## 2022-09-06 DIAGNOSIS — I10 ESSENTIAL HYPERTENSION: Primary | Chronic | ICD-10-CM

## 2022-09-06 DIAGNOSIS — M81.0 AGE-RELATED OSTEOPOROSIS WITHOUT CURRENT PATHOLOGICAL FRACTURE: ICD-10-CM

## 2022-09-06 DIAGNOSIS — G80.2 SPASTIC HEMIPLEGIC CEREBRAL PALSY (HCC): Chronic | ICD-10-CM

## 2022-09-06 DIAGNOSIS — M15.9 PRIMARY OSTEOARTHRITIS INVOLVING MULTIPLE JOINTS: Chronic | ICD-10-CM

## 2022-09-06 DIAGNOSIS — F41.9 ANXIETY: Chronic | ICD-10-CM

## 2022-09-06 DIAGNOSIS — E11.9 DIET-CONTROLLED DIABETES MELLITUS (HCC): ICD-10-CM

## 2022-09-06 DIAGNOSIS — E03.9 ACQUIRED HYPOTHYROIDISM: ICD-10-CM

## 2022-09-06 PROCEDURE — 3044F HG A1C LEVEL LT 7.0%: CPT | Performed by: FAMILY MEDICINE

## 2022-09-06 PROCEDURE — 99214 OFFICE O/P EST MOD 30 MIN: CPT | Performed by: FAMILY MEDICINE

## 2022-09-06 PROCEDURE — 1123F ACP DISCUSS/DSCN MKR DOCD: CPT | Performed by: FAMILY MEDICINE

## 2022-09-06 ASSESSMENT — ENCOUNTER SYMPTOMS
EYES NEGATIVE: 1
GASTROINTESTINAL NEGATIVE: 1
RESPIRATORY NEGATIVE: 1
ALLERGIC/IMMUNOLOGIC NEGATIVE: 1

## 2022-09-06 NOTE — PROGRESS NOTES
22  Name: Miguelito Manuel    : 1945    Sex: male    Age: 67 y.o. Subjective:  Chief Complaint: Patient is here for 6 mo ck  re      chol bp   vit   d a rth  cerebral palsy        anxiety      Feels ok   no cp or sob  in wheelchair  drove  here himself   lab  ok   he forgot if he fated         Wife not  great    memory worse   she is   6 yrs older      Review of Systems   Constitutional: Negative. HENT: Negative. Eyes: Negative. Respiratory: Negative. Cardiovascular: Negative. Gastrointestinal: Negative. Endocrine: Negative. Genitourinary: Negative. Musculoskeletal:  Positive for arthralgias. Skin: Negative. Allergic/Immunologic: Negative. Neurological: Negative. Hpii   Hematological: Negative. Psychiatric/Behavioral: Negative.          Current Outpatient Medications:     metoprolol succinate (TOPROL XL) 50 MG extended release tablet, Take 1 tablet by mouth 2 times daily, Disp: 180 tablet, Rfl: 5    losartan (COZAAR) 25 MG tablet, Take 1 tablet by mouth every morning, Disp: 90 tablet, Rfl: 5    FLUoxetine (PROZAC) 20 MG capsule, Take 1 capsule by mouth 2 times daily, Disp: 180 capsule, Rfl: 5    clopidogrel (PLAVIX) 75 MG tablet, Take 1 tablet by mouth daily, Disp: 90 tablet, Rfl: 5    vitamin B-12 (CYANOCOBALAMIN) 1000 MCG tablet, Take 1,000 mcg by mouth daily, Disp: , Rfl:     Cholecalciferol (VITAMIN D3) 2000 units CAPS, Take 1 capsule by mouth daily, Disp: , Rfl:     aspirin EC 81 MG EC tablet, Take 81 mg by mouth daily, Disp: , Rfl:   Allergies   Allergen Reactions    Lidocaine     Lodine [Etodolac]     Rofecoxib      Social History     Socioeconomic History    Marital status:      Spouse name: Not on file    Number of children: Not on file    Years of education: Not on file    Highest education level: Not on file   Occupational History    Not on file   Tobacco Use    Smoking status: Former     Packs/day: 0.25     Years: 6.00     Pack years: 1.50     Types: Cigarettes     Start date: 1959     Quit date: 1965     Years since quittin.7    Smokeless tobacco: Never   Substance and Sexual Activity    Alcohol use: No    Drug use: No    Sexual activity: Not on file   Other Topics Concern    Not on file   Social History Narrative        CARDIAC [address]    GERD    DISABILITY FOR CRUSHED FEET/LOW BACK/ L HAND----    GASTRITIS    H PYLORI    ORTHOSTATIC HYPOTENSION  POS TTT PALLIDINO    NEUROCARDIOGENIC SYNCOPE    SMOKER QUIT 25 YRS AGO    HX L CTS OR    Edgar Dunne  1945 Page #2    Kimball Rakers L ING HERNIA OR    L ANKEL FUSION    BPH WITH ELEV PSA---BX    ED    OSTEOPOROSIS    RUPTURED L EAR DRUM    SMALL R RENAL DIS    FALL  OFF ROOF    FX L FERUR 3-    NEUROPATHY LEGS--VIT-B-12 MONTHLY    ONE CHILD WITH WIFE (BOY)----  AT 37 WITH KIDNEY FAILURE----- AND SHE HAS 7    OTHER CHILDREN    WIFE  09 ON PT BIRTHDAY WITH CA THAT SHE IGNORED--BREAST--SHE 9 YRS OLDER     12--SHE 6 YRS OLDER    SYNCOPE 4-10--REFUSED TO SE PALIDINO    ED    COLON 9-10 D R [place]---DUE 10 YRS    SYNCOPE  [facility]---POSS TIA    ST E [id number]    EVAL DR VINES 10-17 WITH ECHO WITH PFO OR ASD AND JSUT CONT SAME WITH    ASA---PLAN RE CK ONE YEAR    EVAL DR ADAMS NEURO SAID NOT SURE IF TIA AND SEE ONE YEAR---10-17    EVAL CARDIO [facility] DR BELTRE-----3-18    THIRD POSS TIA 3-18--ADDED PLAVIX 18    HAS ELECTRIC CHAIR AND VAN    WIFE DEMENTIA 2-19 REFUSES TO LEAVE HOUSE    Step daughter   at 3   2-21    Covid  7-21    even with moderna vaccine    Wife admit with uti  -----son not see her--she 6 yrs olde     Social Determinants of Health     Financial Resource Strain: Not on file   Food Insecurity: Not on file   Transportation Needs: Not on file   Physical Activity: Inactive    Days of Exercise per Week: 0 days    Minutes of Exercise per Session: 0 min   Stress: Not on file   Social Connections: Not on file   Intimate Partner Violence: Not on file   Housing Stability: Not on file      Past Medical History:   Diagnosis Date    Atrial septal defect     BPH (benign prostatic hyperplasia)     Cardiac arrhythmia     Conduction disorder of the heart     Degenerative joint disease involving multiple joints     Depression     Erectile dysfunction     Gastritis     GERD (gastroesophageal reflux disease)     Hyperlipidemia     Hypertension     Neurocardiogenic syncope     Neuropathic pain of both legs     Orthostatic hypotension     Osteoporosis     Ruptured ear drum, left     Spastic hemiplegic cerebral palsy ([address]     Syncope      No family history on file. Past Surgical History:   Procedure Laterality Date    CARPAL TUNNEL RELEASE Left     ECHOCARDIOGRAM COMPLETE 2D W DOPPLER W COLOR  10/31/2012         INGUINAL HERNIA REPAIR        Vitals:    09/06/22 1121   Pulse: 54   Temp: 97.4 °F (36.3 °C)   SpO2: 98%   Height: 5' 10\" (1.778 m)       Objective:    Physical Exam  Vitals reviewed. Constitutional:       Appearance: Normal appearance. He is well-developed. HENT:      Head: Normocephalic. Right Ear: Tympanic membrane normal.      Left Ear: Tympanic membrane normal.      Nose: Nose normal.      Mouth/Throat:      Mouth: Mucous membranes are moist.   Eyes:      Pupils: Pupils are equal, round, and reactive to light. Cardiovascular:      Rate and Rhythm: Normal rate and regular rhythm. Pulmonary:      Effort: Pulmonary effort is normal.      Breath sounds: Normal breath sounds. Abdominal:      General: Bowel sounds are normal.      Palpations: Abdomen is soft. Musculoskeletal:      Cervical back: Normal range of motion. Comments: Rked dec rom  both knees   Skin:     General: Skin is warm. Neurological:      Mental Status: He is alert and oriented to person, place, and time. Motor: Weakness (lower extrem) present.    Psychiatric:         Behavior: Behavior normal. Damion Maxwell was seen today for follow-up and discuss labs. Diagnoses and all orders for this visit:    Essential hypertension    Mixed hyperlipidemia    Spastic hemiplegic cerebral palsy (HCC)    Primary osteoarthritis involving multiple joints    Anxiety      Comments:   la bmoted   diet exer   hm issues  advid get help for wife    I educated the patient about all medications. Make sure they were correct and educated  on the risk associated with missing meds or taking them incorrectly. A list of medications is being sent home with patient today. Check blood pressure at home twice a day. Low-salt low caffeine diet. Call if systolic blood pressure is above 150 and diastolic blood pressures above 85. Only use a upper arm digital cuff. Aggressive low-fat diet. Avoid red meats, greasy fried foods, dairy products. Avoid processed foods. Take cholesterol medications without food. I informed patient about the risk associated with noncompliance of medication and taking medications incorrectly. Appropriate follow-up with myself and all specialist.  Encourage family members to take active role in assisting with medications and medical care. If any confusion should develop to notify my office immediately to avoid risk of worsening medical condition    A great deal of time spent reviewing medications, diet, exercise, social issues. Also reviewing the chart before entering the room with patient and finishing charting after leaving patient's room. More than half of that time was spent face to face with the patient in counseling and coordinating care. Follow Up: Return in about 6 months (around 3/6/2023) for Lab Before.      Seen by:  Perla Larios DO

## 2022-10-03 DIAGNOSIS — I10 ESSENTIAL HYPERTENSION: Chronic | ICD-10-CM

## 2022-10-03 RX ORDER — LOSARTAN POTASSIUM 25 MG/1
25 TABLET ORAL EVERY MORNING
Qty: 90 TABLET | Refills: 3 | Status: SHIPPED | OUTPATIENT
Start: 2022-10-03

## 2023-02-21 DIAGNOSIS — I10 ESSENTIAL HYPERTENSION: Chronic | ICD-10-CM

## 2023-02-21 RX ORDER — METOPROLOL SUCCINATE 50 MG/1
50 TABLET, EXTENDED RELEASE ORAL 2 TIMES DAILY
Qty: 180 TABLET | Refills: 5 | Status: SHIPPED | OUTPATIENT
Start: 2023-02-21

## 2023-02-23 DIAGNOSIS — I10 ESSENTIAL HYPERTENSION: Chronic | ICD-10-CM

## 2023-02-23 DIAGNOSIS — M81.0 AGE-RELATED OSTEOPOROSIS WITHOUT CURRENT PATHOLOGICAL FRACTURE: ICD-10-CM

## 2023-02-23 DIAGNOSIS — Z12.5 PROSTATE CANCER SCREENING: ICD-10-CM

## 2023-02-23 DIAGNOSIS — E78.2 MIXED HYPERLIPIDEMIA: Chronic | ICD-10-CM

## 2023-02-23 DIAGNOSIS — E03.9 ACQUIRED HYPOTHYROIDISM: ICD-10-CM

## 2023-02-23 DIAGNOSIS — E11.9 DIET-CONTROLLED DIABETES MELLITUS (HCC): ICD-10-CM

## 2023-02-23 LAB
ALBUMIN SERPL-MCNC: 3.8 G/DL (ref 3.5–5.2)
ALP BLD-CCNC: 89 U/L (ref 40–129)
ALT SERPL-CCNC: 9 U/L (ref 0–40)
ANION GAP SERPL CALCULATED.3IONS-SCNC: 11 MMOL/L (ref 7–16)
AST SERPL-CCNC: 16 U/L (ref 0–39)
BASOPHILS ABSOLUTE: 0.05 E9/L (ref 0–0.2)
BASOPHILS RELATIVE PERCENT: 1 % (ref 0–2)
BILIRUB SERPL-MCNC: 0.6 MG/DL (ref 0–1.2)
BUN BLDV-MCNC: 12 MG/DL (ref 6–23)
CALCIUM SERPL-MCNC: 9.2 MG/DL (ref 8.6–10.2)
CHLORIDE BLD-SCNC: 106 MMOL/L (ref 98–107)
CHOLESTEROL, TOTAL: 129 MG/DL (ref 0–199)
CO2: 24 MMOL/L (ref 22–29)
CREAT SERPL-MCNC: 0.9 MG/DL (ref 0.7–1.2)
EOSINOPHILS ABSOLUTE: 0.19 E9/L (ref 0.05–0.5)
EOSINOPHILS RELATIVE PERCENT: 3.7 % (ref 0–6)
GFR SERPL CREATININE-BSD FRML MDRD: >60 ML/MIN/1.73
GLUCOSE BLD-MCNC: 102 MG/DL (ref 74–99)
HBA1C MFR BLD: 5 % (ref 4–5.6)
HCT VFR BLD CALC: 40.4 % (ref 37–54)
HDLC SERPL-MCNC: 44 MG/DL
HEMOGLOBIN: 13.4 G/DL (ref 12.5–16.5)
IMMATURE GRANULOCYTES #: 0.02 E9/L
IMMATURE GRANULOCYTES %: 0.4 % (ref 0–5)
LDL CHOLESTEROL CALCULATED: 65 MG/DL (ref 0–99)
LYMPHOCYTES ABSOLUTE: 1.5 E9/L (ref 1.5–4)
LYMPHOCYTES RELATIVE PERCENT: 29 % (ref 20–42)
MCH RBC QN AUTO: 31 PG (ref 26–35)
MCHC RBC AUTO-ENTMCNC: 33.2 % (ref 32–34.5)
MCV RBC AUTO: 93.5 FL (ref 80–99.9)
MONOCYTES ABSOLUTE: 0.52 E9/L (ref 0.1–0.95)
MONOCYTES RELATIVE PERCENT: 10.1 % (ref 2–12)
NEUTROPHILS ABSOLUTE: 2.89 E9/L (ref 1.8–7.3)
NEUTROPHILS RELATIVE PERCENT: 55.8 % (ref 43–80)
PDW BLD-RTO: 12.8 FL (ref 11.5–15)
PLATELET # BLD: 222 E9/L (ref 130–450)
PMV BLD AUTO: 9.4 FL (ref 7–12)
POTASSIUM SERPL-SCNC: 4.3 MMOL/L (ref 3.5–5)
PROSTATE SPECIFIC ANTIGEN: 1.34 NG/ML (ref 0–4)
RBC # BLD: 4.32 E12/L (ref 3.8–5.8)
SODIUM BLD-SCNC: 141 MMOL/L (ref 132–146)
TOTAL PROTEIN: 6.7 G/DL (ref 6.4–8.3)
TRIGL SERPL-MCNC: 101 MG/DL (ref 0–149)
TSH SERPL DL<=0.05 MIU/L-ACNC: 2.35 UIU/ML (ref 0.27–4.2)
VITAMIN D 25-HYDROXY: 55 NG/ML (ref 30–100)
VLDLC SERPL CALC-MCNC: 20 MG/DL
WBC # BLD: 5.2 E9/L (ref 4.5–11.5)

## 2023-03-07 ENCOUNTER — OFFICE VISIT (OUTPATIENT)
Dept: PRIMARY CARE CLINIC | Age: 78
End: 2023-03-07
Payer: MEDICARE

## 2023-03-07 DIAGNOSIS — G45.9 TIA (TRANSIENT ISCHEMIC ATTACK): ICD-10-CM

## 2023-03-07 DIAGNOSIS — F41.9 ANXIETY: ICD-10-CM

## 2023-03-07 DIAGNOSIS — I10 ESSENTIAL HYPERTENSION: Primary | Chronic | ICD-10-CM

## 2023-03-07 DIAGNOSIS — M15.9 PRIMARY OSTEOARTHRITIS INVOLVING MULTIPLE JOINTS: Chronic | ICD-10-CM

## 2023-03-07 DIAGNOSIS — G80.2 SPASTIC HEMIPLEGIC CEREBRAL PALSY (HCC): Chronic | ICD-10-CM

## 2023-03-07 DIAGNOSIS — G62.9 NEUROPATHY: Chronic | ICD-10-CM

## 2023-03-07 DIAGNOSIS — H61.21 IMPACTED CERUMEN, RIGHT EAR: ICD-10-CM

## 2023-03-07 PROCEDURE — 3074F SYST BP LT 130 MM HG: CPT | Performed by: FAMILY MEDICINE

## 2023-03-07 PROCEDURE — 1123F ACP DISCUSS/DSCN MKR DOCD: CPT | Performed by: FAMILY MEDICINE

## 2023-03-07 PROCEDURE — 3078F DIAST BP <80 MM HG: CPT | Performed by: FAMILY MEDICINE

## 2023-03-07 PROCEDURE — 99214 OFFICE O/P EST MOD 30 MIN: CPT | Performed by: FAMILY MEDICINE

## 2023-03-07 RX ORDER — FLUOXETINE HYDROCHLORIDE 20 MG/1
20 CAPSULE ORAL 2 TIMES DAILY
Qty: 180 CAPSULE | Refills: 5 | Status: SHIPPED | OUTPATIENT
Start: 2023-03-07

## 2023-03-07 RX ORDER — CLOPIDOGREL BISULFATE 75 MG/1
75 TABLET ORAL DAILY
Qty: 90 TABLET | Refills: 5 | Status: SHIPPED | OUTPATIENT
Start: 2023-03-07

## 2023-03-07 NOTE — PROGRESS NOTES
3/7/23  Name: Phill Gao    : 1945    Sex: male    Age: 65 y.o. Subjective:  Chief Complaint: Patient is here for 6-month checkup regarding cholesterol blood pressure vitamin D arthritis cerebral palsy anxiety reduced hearing. He is doing well. Never complains of anything. His wife is getting worse. Patient is here alone in his wheelchair with his admit and handicap Michelle Ambrocio. He has no complaints. Except his hearing is diminished did not wear his hearing aid today. He had a left ear at all. Review of Systems   Constitutional: Negative. HENT: Negative. Decreased hearing   Eyes: Negative. Respiratory: Negative. Cardiovascular: Negative. Gastrointestinal: Negative. Endocrine: Negative. Genitourinary: Negative. Musculoskeletal: Negative. Skin: Negative. Allergic/Immunologic: Negative. Neurological:  Positive for weakness (Weakness lower extremities). Hematological: Negative. Psychiatric/Behavioral: Negative.          Current Outpatient Medications:     FLUoxetine (PROZAC) 20 MG capsule, Take 1 capsule by mouth 2 times daily, Disp: 180 capsule, Rfl: 5    clopidogrel (PLAVIX) 75 MG tablet, Take 1 tablet by mouth daily, Disp: 90 tablet, Rfl: 5    metoprolol succinate (TOPROL XL) 50 MG extended release tablet, Take 1 tablet by mouth 2 times daily, Disp: 180 tablet, Rfl: 5    losartan (COZAAR) 25 MG tablet, Take 1 tablet by mouth every morning, Disp: 90 tablet, Rfl: 3    vitamin B-12 (CYANOCOBALAMIN) 1000 MCG tablet, Take 1,000 mcg by mouth daily, Disp: , Rfl:     Cholecalciferol (VITAMIN D3) 2000 units CAPS, Take 1 capsule by mouth daily, Disp: , Rfl:     aspirin EC 81 MG EC tablet, Take 81 mg by mouth daily, Disp: , Rfl:   Allergies   Allergen Reactions    Lidocaine     Lodine [Etodolac]     Rofecoxib      Social History     Socioeconomic History    Marital status:      Spouse name: Not on file    Number of children: Not on file    Years of education: Not on file    Highest education level: Not on file   Occupational History    Not on file   Tobacco Use    Smoking status: Former     Packs/day: 0.25     Years: 6.00     Pack years: 1.50     Types: Cigarettes     Start date: 1959     Quit date: 1965     Years since quittin.2    Smokeless tobacco: Never   Substance and Sexual Activity    Alcohol use: No    Drug use: No    Sexual activity: Not on file   Other Topics Concern    Not on file   Social History Narrative        CARDIAC [address]    GERD    DISABILITY FOR CRUSHED FEET/LOW BACK/ L HAND----    GASTRITIS    H PYLORI    ORTHOSTATIC HYPOTENSION  POS TTT PALLIDINO    NEUROCARDIOGENIC SYNCOPE    SMOKER QUIT 25 YRS AGO    HX L CTS OR    Adenike Nones  1945 Page #2    [place] [employer] L ING HERNIA OR    L ANKEL FUSION    BPH WITH ELEV PSA---BX    ED    OSTEOPOROSIS    RUPTURED L EAR DRUM    SMALL R RENAL DIS    FALL  OFF ROOF    FX L FERUR 3-07    NEUROPATHY LEGS--VIT-B-12 MONTHLY    ONE CHILD WITH WIFE (BOY)----  AT 37 WITH KIDNEY FAILURE----- AND SHE HAS 7    OTHER CHILDREN    WIFE  09 ON PT BIRTHDAY WITH CA THAT SHE IGNORED--BREAST--SHE 9 YRS OLDER     -12--SHE 6 YRS OLDER    SYNCOPE 4-10--REFUSED TO SE PALIDINO    ED    COLON 9-10 D R [place]---DUE 10 YRS    SYNCOPE  [facility]---POSS TIA    ST E [id number]    EVAL DR VINES 10-17 WITH ECHO WITH PFO OR ASD AND JSUT CONT SAME WITH    ASA---PLAN RE CK ONE YEAR    EVAL DR ADAMS NEURO SAID NOT SURE IF TIA AND SEE ONE YEAR---10-17    EVAL CARDIO [facility] DR BELTRE-----3-18    THIRD POSS TIA 3-18--ADDED PLAVIX 4-18    HAS ELECTRIC CHAIR AND VAN    WIFE DEMENTIA 2-19 REFUSES TO LEAVE HOUSE    Step daughter   at 3   2-21    Covid  7-21    even with moderna vaccine    Wife admit with uti  -----son not see her--she 6 yrs olde     Social Determinants of Health     Financial Resource Strain: Not on file   Food Insecurity: Not on file   Transportation Needs: Not on file   Physical Activity: Inactive    Days of Exercise per Week: 0 days    Minutes of Exercise per Session: 0 min   Stress: Not on file   Social Connections: Not on file   Intimate Partner Violence: Not on file   Housing Stability: Not on file      Past Medical History:   Diagnosis Date    Atrial septal defect     BPH (benign prostatic hyperplasia)     Cardiac arrhythmia     Conduction disorder of the heart     Degenerative joint disease involving multiple joints     Depression     Erectile dysfunction     Gastritis     GERD (gastroesophageal reflux disease)     Hyperlipidemia     Hypertension     Neurocardiogenic syncope     Neuropathic pain of both legs     Orthostatic hypotension     Osteoporosis     Ruptured ear drum, left     Spastic hemiplegic cerebral palsy ([address]     Syncope      No family history on file. Past Surgical History:   Procedure Laterality Date    CARPAL TUNNEL RELEASE Left     ECHOCARDIOGRAM COMPLETE 2D W DOPPLER W COLOR  10/31/2012         INGUINAL HERNIA REPAIR        Vitals:    03/07/23 1111   BP: 132/82   Pulse: 58   Temp: 97.9 °F (36.6 °C)   TempSrc: Oral   SpO2: 97%   Weight: Comment: wheelchair       Objective:    Physical Exam  Vitals reviewed. Constitutional:       Appearance: Normal appearance. He is well-developed. HENT:      Head: Normocephalic. Right Ear: Tympanic membrane normal.      Left Ear: Tympanic membrane normal.      Ears:      Comments: Left canal clear. Right very hard wax. Soak with peroxide for 15 minutes and flushed with 2 very large pieces of hard wax but the eardrum intact. Was able to hear instantly. Nose: Nose normal.      Mouth/Throat:      Mouth: Mucous membranes are moist.   Eyes:      Pupils: Pupils are equal, round, and reactive to light. Cardiovascular:      Rate and Rhythm: Normal rate and regular rhythm.    Pulmonary:      Effort: Pulmonary effort is normal.      Breath sounds: Normal breath sounds. Abdominal:      General: Bowel sounds are normal.      Palpations: Abdomen is soft. Musculoskeletal:      Cervical back: Normal range of motion. Comments: Marked weakness lower extremities   Skin:     General: Skin is warm. Neurological:      Mental Status: He is alert and oriented to person, place, and time. Psychiatric:         Behavior: Behavior normal.       Mariama Nunez was seen today for discuss labs. Diagnoses and all orders for this visit:    Essential hypertension  -     CBC with Auto Differential; Future  -     Comprehensive Metabolic Panel; Future  -     Lipid Panel; Future    Anxiety  -     FLUoxetine (PROZAC) 20 MG capsule; Take 1 capsule by mouth 2 times daily    TIA (transient ischemic attack)  -     clopidogrel (PLAVIX) 75 MG tablet; Take 1 tablet by mouth daily    Impacted cerumen, right ear    Neuropathy  -     CBC with Auto Differential; Future  -     Comprehensive Metabolic Panel; Future  -     Lipid Panel; Future    Spastic hemiplegic cerebral palsy (HCC)    Primary osteoarthritis involving multiple joints      Comments: Large earwax removed in the right ear. Low-fat low sugar diet. Check blood pressure at home. Offered neuro and physical therapy declines. Encourage vitamin D3 2000 daily. Anxiety is well controlled. .    I educated the patient about all medications. Make sure they were correct and educated  on the risk associated with missing meds or taking them incorrectly. A list of medications is being sent home with patient today. Check blood pressure at home twice a day. Low-salt low caffeine diet. Call if systolic blood pressure is above 933 and diastolic blood pressures above 85. Only use a upper arm digital cuff. Aggressive low-fat diet. Avoid red meats, greasy fried foods, dairy products. Avoid processed foods. Take cholesterol medications without food. A great deal of time spent reviewing medications, diet, exercise, social issues.  Also reviewing the chart before entering the room with patient and finishing charting after leaving patient's room. More than half of that time was spent face to face with the patient in counseling and coordinating care.      .I informed patient about the risk associated with noncompliance of medication and taking medications incorrectly.  Appropriate follow-up with myself and all specialist.  Encourage family members to take active role in assisting with medications and medical care.  If any confusion should develop to notify my office immediately to avoid risk of worsening medical condition      Follow Up: Return in about 6 months (around 9/7/2023) for Lab Before.     Seen by:  Salvatore Robertson DO

## 2023-03-08 VITALS
OXYGEN SATURATION: 97 % | HEART RATE: 58 BPM | DIASTOLIC BLOOD PRESSURE: 82 MMHG | TEMPERATURE: 97.9 F | SYSTOLIC BLOOD PRESSURE: 132 MMHG

## 2023-03-08 ASSESSMENT — PATIENT HEALTH QUESTIONNAIRE - PHQ9
SUM OF ALL RESPONSES TO PHQ9 QUESTIONS 1 & 2: 0
SUM OF ALL RESPONSES TO PHQ QUESTIONS 1-9: 0
2. FEELING DOWN, DEPRESSED OR HOPELESS: 0
1. LITTLE INTEREST OR PLEASURE IN DOING THINGS: 0
SUM OF ALL RESPONSES TO PHQ QUESTIONS 1-9: 0

## 2023-03-08 ASSESSMENT — ENCOUNTER SYMPTOMS
ALLERGIC/IMMUNOLOGIC NEGATIVE: 1
RESPIRATORY NEGATIVE: 1
EYES NEGATIVE: 1
GASTROINTESTINAL NEGATIVE: 1

## 2023-04-07 ENCOUNTER — OFFICE VISIT (OUTPATIENT)
Dept: FAMILY MEDICINE CLINIC | Age: 78
End: 2023-04-07
Payer: MEDICARE

## 2023-04-07 VITALS
RESPIRATION RATE: 18 BRPM | SYSTOLIC BLOOD PRESSURE: 134 MMHG | HEART RATE: 63 BPM | DIASTOLIC BLOOD PRESSURE: 62 MMHG | TEMPERATURE: 96.8 F | OXYGEN SATURATION: 97 % | BODY MASS INDEX: 30.42 KG/M2 | HEIGHT: 70 IN

## 2023-04-07 DIAGNOSIS — L03.312 CELLULITIS OF BACK EXCEPT BUTTOCK: ICD-10-CM

## 2023-04-07 DIAGNOSIS — L08.9 INFECTED SEBACEOUS CYST: Primary | ICD-10-CM

## 2023-04-07 DIAGNOSIS — L72.3 INFECTED SEBACEOUS CYST: Primary | ICD-10-CM

## 2023-04-07 DIAGNOSIS — L72.3 INFECTED SEBACEOUS CYST: ICD-10-CM

## 2023-04-07 DIAGNOSIS — L08.9 INFECTED SEBACEOUS CYST: ICD-10-CM

## 2023-04-07 PROCEDURE — 3078F DIAST BP <80 MM HG: CPT | Performed by: EMERGENCY MEDICINE

## 2023-04-07 PROCEDURE — 3075F SYST BP GE 130 - 139MM HG: CPT | Performed by: EMERGENCY MEDICINE

## 2023-04-07 PROCEDURE — 99214 OFFICE O/P EST MOD 30 MIN: CPT | Performed by: EMERGENCY MEDICINE

## 2023-04-07 PROCEDURE — 1123F ACP DISCUSS/DSCN MKR DOCD: CPT | Performed by: EMERGENCY MEDICINE

## 2023-04-07 RX ORDER — CEPHALEXIN 500 MG/1
500 CAPSULE ORAL 2 TIMES DAILY
Qty: 20 CAPSULE | Refills: 0 | Status: SHIPPED | OUTPATIENT
Start: 2023-04-07 | End: 2023-04-17

## 2023-04-07 ASSESSMENT — ENCOUNTER SYMPTOMS
WHEEZING: 0
EYE DISCHARGE: 0
EYE REDNESS: 0
NAUSEA: 0
VOMITING: 0
ABDOMINAL PAIN: 0
BACK PAIN: 0
DIARRHEA: 0
EYE PAIN: 0
SINUS PRESSURE: 0
SHORTNESS OF BREATH: 0
COUGH: 0
SORE THROAT: 0

## 2023-04-07 NOTE — PROGRESS NOTES
systems reviewed and are negative. Patient's medical, social, and family history reviewed    Past Medical History:  has a past medical history of Atrial septal defect, BPH (benign prostatic hyperplasia), Cardiac arrhythmia, Conduction disorder of the heart, Degenerative joint disease involving multiple joints, Depression, Erectile dysfunction, Gastritis, GERD (gastroesophageal reflux disease), Hyperlipidemia, Hypertension, Neurocardiogenic syncope, Neuropathic pain of both legs, Orthostatic hypotension, Osteoporosis, Ruptured ear drum, left, Spastic hemiplegic cerebral palsy ([address] and Syncope. Past Surgical History:  has a past surgical history that includes ECHO Complete 2D W Doppler W Color (10/31/2012); Carpal tunnel release (Left); and Inguinal hernia repair. Social History:  reports that he quit smoking about 58 years ago. His smoking use included cigarettes. He started smoking about 64 years ago. He has a 1.50 pack-year smoking history. He has never used smokeless tobacco. He reports that he does not drink alcohol and does not use drugs. Family History: family history is not on file. Allergies: Lidocaine, Lodine [etodolac], and Rofecoxib    Physical Exam   Vital Signs:  /62   Pulse 63   Temp 96.8 °F (36 °C) (Temporal)   Resp 18   Ht 5' 10\" (1.778 m)   SpO2 97%   BMI 30.42 kg/m²    Oxygen Saturation Interpretation: Normal.    Physical Exam  Vitals and nursing note reviewed. Constitutional:       Appearance: He is well-developed. HENT:      Head: Normocephalic and atraumatic. Eyes:      Pupils: Pupils are equal, round, and reactive to light. Cardiovascular:      Rate and Rhythm: Normal rate and regular rhythm. Heart sounds: Normal heart sounds. No murmur heard. Pulmonary:      Effort: Pulmonary effort is normal. No respiratory distress. Breath sounds: Normal breath sounds. No wheezing or rales.    Abdominal:      General: Bowel sounds are normal.      Palpations: Abdomen is

## 2023-04-19 ENCOUNTER — OFFICE VISIT (OUTPATIENT)
Dept: PRIMARY CARE CLINIC | Age: 78
End: 2023-04-19
Payer: MEDICARE

## 2023-04-19 DIAGNOSIS — I10 ESSENTIAL HYPERTENSION: Chronic | ICD-10-CM

## 2023-04-19 DIAGNOSIS — L02.212 ABSCESS OF BACK: Primary | ICD-10-CM

## 2023-04-19 PROCEDURE — 3074F SYST BP LT 130 MM HG: CPT | Performed by: FAMILY MEDICINE

## 2023-04-19 PROCEDURE — 3078F DIAST BP <80 MM HG: CPT | Performed by: FAMILY MEDICINE

## 2023-04-19 PROCEDURE — 1123F ACP DISCUSS/DSCN MKR DOCD: CPT | Performed by: FAMILY MEDICINE

## 2023-04-19 PROCEDURE — 99213 OFFICE O/P EST LOW 20 MIN: CPT | Performed by: FAMILY MEDICINE

## 2023-04-19 RX ORDER — DOXYCYCLINE HYCLATE 100 MG
100 TABLET ORAL 2 TIMES DAILY
Qty: 20 TABLET | Refills: 0 | Status: SHIPPED | OUTPATIENT
Start: 2023-04-19 | End: 2023-04-29

## 2023-04-19 NOTE — PROGRESS NOTES
and diastolic blood pressures above 85. Only use a upper arm digital cuff. I informed patient about the risk associated with noncompliance of medication and taking medications incorrectly. Appropriate follow-up with myself and all specialist.  Encourage family members to take active role in assisting with medications and medical care. If any confusion should develop to notify my office immediately to avoid risk of worsening medical condition      A great deal of time spent reviewing medications, diet, exercise, social issues. Also reviewing the chart before entering the room with patient and finishing charting after leaving patient's room. More than half of that time was spent face to face with the patient in counseling and coordinating care. Follow Up: Return if symptoms worsen or fail to improve, for Reg Appt, Meds. If worse go to ER. Not better in 24 hr see.      Seen by:  Gabrielle Rangel, DO

## 2023-04-20 VITALS
HEART RATE: 57 BPM | BODY MASS INDEX: 30.42 KG/M2 | SYSTOLIC BLOOD PRESSURE: 138 MMHG | HEIGHT: 70 IN | OXYGEN SATURATION: 97 % | TEMPERATURE: 97.3 F | DIASTOLIC BLOOD PRESSURE: 82 MMHG

## 2023-04-20 ASSESSMENT — PATIENT HEALTH QUESTIONNAIRE - PHQ9
SUM OF ALL RESPONSES TO PHQ QUESTIONS 1-9: 0
SUM OF ALL RESPONSES TO PHQ9 QUESTIONS 1 & 2: 0
2. FEELING DOWN, DEPRESSED OR HOPELESS: 0
SUM OF ALL RESPONSES TO PHQ QUESTIONS 1-9: 0
SUM OF ALL RESPONSES TO PHQ QUESTIONS 1-9: 0
1. LITTLE INTEREST OR PLEASURE IN DOING THINGS: 0
SUM OF ALL RESPONSES TO PHQ QUESTIONS 1-9: 0

## 2023-04-20 ASSESSMENT — ENCOUNTER SYMPTOMS
RESPIRATORY NEGATIVE: 1
ROS SKIN COMMENTS: HPI

## 2023-05-08 DIAGNOSIS — F41.9 ANXIETY: ICD-10-CM

## 2023-05-08 RX ORDER — FLUOXETINE HYDROCHLORIDE 20 MG/1
20 CAPSULE ORAL 2 TIMES DAILY
Qty: 180 CAPSULE | Refills: 5 | Status: SHIPPED | OUTPATIENT
Start: 2023-05-08

## 2023-05-31 DIAGNOSIS — G45.9 TIA (TRANSIENT ISCHEMIC ATTACK): ICD-10-CM

## 2023-05-31 RX ORDER — CLOPIDOGREL BISULFATE 75 MG/1
75 TABLET ORAL DAILY
Qty: 90 TABLET | Refills: 5 | Status: SHIPPED | OUTPATIENT
Start: 2023-05-31

## 2023-09-05 DIAGNOSIS — I10 ESSENTIAL HYPERTENSION: Chronic | ICD-10-CM

## 2023-09-05 DIAGNOSIS — G62.9 NEUROPATHY: Chronic | ICD-10-CM

## 2023-09-05 LAB
ABSOLUTE IMMATURE GRANULOCYTE: 0.04 K/UL (ref 0–0.58)
ALBUMIN SERPL-MCNC: 4 G/DL (ref 3.5–5.2)
ALP BLD-CCNC: 79 U/L (ref 40–129)
ALT SERPL-CCNC: 5 U/L (ref 0–40)
ANION GAP SERPL CALCULATED.3IONS-SCNC: 15 MMOL/L (ref 7–16)
AST SERPL-CCNC: 16 U/L (ref 0–39)
BASOPHILS ABSOLUTE: 0.06 K/UL (ref 0–0.2)
BASOPHILS RELATIVE PERCENT: 1 % (ref 0–2)
BILIRUB SERPL-MCNC: 0.3 MG/DL (ref 0–1.2)
BUN BLDV-MCNC: 13 MG/DL (ref 6–23)
CALCIUM SERPL-MCNC: 9 MG/DL (ref 8.6–10.2)
CHLORIDE BLD-SCNC: 105 MMOL/L (ref 98–107)
CHOLESTEROL: 147 MG/DL
CO2: 22 MMOL/L (ref 22–29)
CREAT SERPL-MCNC: 0.8 MG/DL (ref 0.7–1.2)
EOSINOPHILS ABSOLUTE: 0.2 K/UL (ref 0.05–0.5)
EOSINOPHILS RELATIVE PERCENT: 3 % (ref 0–6)
GFR SERPL CREATININE-BSD FRML MDRD: >60 ML/MIN/1.73M2
GLUCOSE BLD-MCNC: 97 MG/DL (ref 74–99)
HCT VFR BLD CALC: 41.1 % (ref 37–54)
HDLC SERPL-MCNC: 44 MG/DL
HEMOGLOBIN: 13.3 G/DL (ref 12.5–16.5)
IMMATURE GRANULOCYTES: 1 % (ref 0–5)
LDL CHOLESTEROL: 84 MG/DL
LYMPHOCYTES ABSOLUTE: 1.65 K/UL (ref 1.5–4)
LYMPHOCYTES RELATIVE PERCENT: 27 % (ref 20–42)
MCH RBC QN AUTO: 30.5 PG (ref 26–35)
MCHC RBC AUTO-ENTMCNC: 32.4 G/DL (ref 32–34.5)
MCV RBC AUTO: 94.3 FL (ref 80–99.9)
MONOCYTES ABSOLUTE: 0.52 K/UL (ref 0.1–0.95)
MONOCYTES RELATIVE PERCENT: 9 % (ref 2–12)
NEUTROPHILS ABSOLUTE: 3.56 K/UL (ref 1.8–7.3)
NEUTROPHILS RELATIVE PERCENT: 59 % (ref 43–80)
PDW BLD-RTO: 13.5 % (ref 11.5–15)
PLATELET # BLD: 222 K/UL (ref 130–450)
PMV BLD AUTO: 9.2 FL (ref 7–12)
POTASSIUM SERPL-SCNC: 4.4 MMOL/L (ref 3.5–5)
RBC # BLD: 4.36 M/UL (ref 3.8–5.8)
SODIUM BLD-SCNC: 142 MMOL/L (ref 132–146)
TOTAL PROTEIN: 6.8 G/DL (ref 6.4–8.3)
TRIGL SERPL-MCNC: 97 MG/DL
VLDLC SERPL CALC-MCNC: 19 MG/DL
WBC # BLD: 6 K/UL (ref 4.5–11.5)

## 2023-09-06 RX ORDER — LOSARTAN POTASSIUM 25 MG/1
25 TABLET ORAL EVERY MORNING
Qty: 90 TABLET | Refills: 3 | Status: SHIPPED | OUTPATIENT
Start: 2023-09-06

## 2023-09-12 ENCOUNTER — OFFICE VISIT (OUTPATIENT)
Dept: PRIMARY CARE CLINIC | Age: 78
End: 2023-09-12
Payer: MEDICARE

## 2023-09-12 VITALS
BODY MASS INDEX: 30.92 KG/M2 | DIASTOLIC BLOOD PRESSURE: 78 MMHG | SYSTOLIC BLOOD PRESSURE: 135 MMHG | HEIGHT: 70 IN | TEMPERATURE: 97.7 F | WEIGHT: 216 LBS

## 2023-09-12 DIAGNOSIS — M81.0 AGE-RELATED OSTEOPOROSIS WITHOUT CURRENT PATHOLOGICAL FRACTURE: ICD-10-CM

## 2023-09-12 DIAGNOSIS — I10 ESSENTIAL HYPERTENSION: Chronic | ICD-10-CM

## 2023-09-12 DIAGNOSIS — F41.9 ANXIETY: Chronic | ICD-10-CM

## 2023-09-12 DIAGNOSIS — Z00.00 MEDICARE ANNUAL WELLNESS VISIT, SUBSEQUENT: Primary | ICD-10-CM

## 2023-09-12 DIAGNOSIS — E78.2 MIXED HYPERLIPIDEMIA: Chronic | ICD-10-CM

## 2023-09-12 DIAGNOSIS — Z12.5 PROSTATE CANCER SCREENING: ICD-10-CM

## 2023-09-12 DIAGNOSIS — M15.9 PRIMARY OSTEOARTHRITIS INVOLVING MULTIPLE JOINTS: Chronic | ICD-10-CM

## 2023-09-12 DIAGNOSIS — G80.2 SPASTIC HEMIPLEGIC CEREBRAL PALSY (HCC): Chronic | ICD-10-CM

## 2023-09-12 DIAGNOSIS — E11.9 DIET-CONTROLLED DIABETES MELLITUS (HCC): ICD-10-CM

## 2023-09-12 DIAGNOSIS — E03.9 ACQUIRED HYPOTHYROIDISM: ICD-10-CM

## 2023-09-12 PROCEDURE — 3078F DIAST BP <80 MM HG: CPT | Performed by: FAMILY MEDICINE

## 2023-09-12 PROCEDURE — 3075F SYST BP GE 130 - 139MM HG: CPT | Performed by: FAMILY MEDICINE

## 2023-09-12 PROCEDURE — G0439 PPPS, SUBSEQ VISIT: HCPCS | Performed by: FAMILY MEDICINE

## 2023-09-12 PROCEDURE — 1123F ACP DISCUSS/DSCN MKR DOCD: CPT | Performed by: FAMILY MEDICINE

## 2023-09-12 PROCEDURE — 3044F HG A1C LEVEL LT 7.0%: CPT | Performed by: FAMILY MEDICINE

## 2023-09-12 ASSESSMENT — PATIENT HEALTH QUESTIONNAIRE - PHQ9
SUM OF ALL RESPONSES TO PHQ QUESTIONS 1-9: 0
SUM OF ALL RESPONSES TO PHQ QUESTIONS 1-9: 0
2. FEELING DOWN, DEPRESSED OR HOPELESS: 0
SUM OF ALL RESPONSES TO PHQ QUESTIONS 1-9: 0
1. LITTLE INTEREST OR PLEASURE IN DOING THINGS: 0
SUM OF ALL RESPONSES TO PHQ QUESTIONS 1-9: 0
SUM OF ALL RESPONSES TO PHQ9 QUESTIONS 1 & 2: 0

## 2023-09-12 ASSESSMENT — LIFESTYLE VARIABLES: HOW MANY STANDARD DRINKS CONTAINING ALCOHOL DO YOU HAVE ON A TYPICAL DAY: PATIENT DOES NOT DRINK

## 2024-02-29 DIAGNOSIS — E78.2 MIXED HYPERLIPIDEMIA: Chronic | ICD-10-CM

## 2024-02-29 DIAGNOSIS — E11.9 DIET-CONTROLLED DIABETES MELLITUS (HCC): ICD-10-CM

## 2024-02-29 DIAGNOSIS — Z12.5 PROSTATE CANCER SCREENING: ICD-10-CM

## 2024-02-29 DIAGNOSIS — I10 ESSENTIAL HYPERTENSION: Chronic | ICD-10-CM

## 2024-02-29 DIAGNOSIS — E03.9 ACQUIRED HYPOTHYROIDISM: ICD-10-CM

## 2024-02-29 DIAGNOSIS — M81.0 AGE-RELATED OSTEOPOROSIS WITHOUT CURRENT PATHOLOGICAL FRACTURE: ICD-10-CM

## 2024-02-29 LAB
ABSOLUTE IMMATURE GRANULOCYTE: 0.03 K/UL (ref 0–0.58)
ALBUMIN SERPL-MCNC: 3.8 G/DL (ref 3.5–5.2)
ALP BLD-CCNC: 69 U/L (ref 40–129)
ALT SERPL-CCNC: 5 U/L (ref 0–40)
ANION GAP SERPL CALCULATED.3IONS-SCNC: 15 MMOL/L (ref 7–16)
AST SERPL-CCNC: 17 U/L (ref 0–39)
BASOPHILS ABSOLUTE: 0.05 K/UL (ref 0–0.2)
BASOPHILS RELATIVE PERCENT: 1 % (ref 0–2)
BILIRUB SERPL-MCNC: 0.7 MG/DL (ref 0–1.2)
BUN BLDV-MCNC: 12 MG/DL (ref 6–23)
CALCIUM SERPL-MCNC: 9.2 MG/DL (ref 8.6–10.2)
CHLORIDE BLD-SCNC: 103 MMOL/L (ref 98–107)
CHOLESTEROL: 118 MG/DL
CO2: 22 MMOL/L (ref 22–29)
CREAT SERPL-MCNC: 0.8 MG/DL (ref 0.7–1.2)
EOSINOPHILS ABSOLUTE: 0.17 K/UL (ref 0.05–0.5)
EOSINOPHILS RELATIVE PERCENT: 3 % (ref 0–6)
GFR SERPL CREATININE-BSD FRML MDRD: >60 ML/MIN/1.73M2
GLUCOSE BLD-MCNC: 88 MG/DL (ref 74–99)
HBA1C MFR BLD: 5.2 % (ref 4–5.6)
HCT VFR BLD CALC: 41.8 % (ref 37–54)
HDLC SERPL-MCNC: 39 MG/DL
HEMOGLOBIN: 13.8 G/DL (ref 12.5–16.5)
IMMATURE GRANULOCYTES: 1 % (ref 0–5)
LDL CHOLESTEROL: 65 MG/DL
LYMPHOCYTES ABSOLUTE: 1.63 K/UL (ref 1.5–4)
LYMPHOCYTES RELATIVE PERCENT: 31 % (ref 20–42)
MCH RBC QN AUTO: 31.3 PG (ref 26–35)
MCHC RBC AUTO-ENTMCNC: 33 G/DL (ref 32–34.5)
MCV RBC AUTO: 94.8 FL (ref 80–99.9)
MONOCYTES ABSOLUTE: 0.49 K/UL (ref 0.1–0.95)
MONOCYTES RELATIVE PERCENT: 9 % (ref 2–12)
NEUTROPHILS ABSOLUTE: 2.85 K/UL (ref 1.8–7.3)
NEUTROPHILS RELATIVE PERCENT: 55 % (ref 43–80)
PDW BLD-RTO: 13 % (ref 11.5–15)
PLATELET # BLD: 219 K/UL (ref 130–450)
PMV BLD AUTO: 9.2 FL (ref 7–12)
POTASSIUM SERPL-SCNC: 4.5 MMOL/L (ref 3.5–5)
PROSTATE SPECIFIC ANTIGEN: 1.21 NG/ML (ref 0–4)
RBC # BLD: 4.41 M/UL (ref 3.8–5.8)
SODIUM BLD-SCNC: 140 MMOL/L (ref 132–146)
T4 TOTAL: 5.5 UG/DL (ref 4.5–11.7)
TOTAL PROTEIN: 6.6 G/DL (ref 6.4–8.3)
TRIGL SERPL-MCNC: 71 MG/DL
TSH SERPL DL<=0.05 MIU/L-ACNC: 1.95 UIU/ML (ref 0.27–4.2)
VITAMIN D 25-HYDROXY: 76.3 NG/ML (ref 30–100)
VLDLC SERPL CALC-MCNC: 14 MG/DL
WBC # BLD: 5.2 K/UL (ref 4.5–11.5)

## 2024-03-07 DIAGNOSIS — I10 ESSENTIAL HYPERTENSION: Chronic | ICD-10-CM

## 2024-03-07 RX ORDER — METOPROLOL SUCCINATE 50 MG/1
50 TABLET, EXTENDED RELEASE ORAL 2 TIMES DAILY
Qty: 180 TABLET | Refills: 5 | Status: SHIPPED | OUTPATIENT
Start: 2024-03-07

## 2024-03-07 NOTE — TELEPHONE ENCOUNTER
Patients last appointment 9/12/2023.  Patients next scheduled appointment   Future Appointments   Date Time Provider Department Center   3/12/2024 11:15 AM Salvatore Robertson DO [facility]

## 2024-03-08 DIAGNOSIS — G45.9 TIA (TRANSIENT ISCHEMIC ATTACK): ICD-10-CM

## 2024-03-08 NOTE — TELEPHONE ENCOUNTER
Patients last appointment 9/12/2023.  Patients next scheduled appointment   Future Appointments   Date Time Provider Department Center   3/12/2024 11:15 AM Salvatore Robertson DO [facility]

## 2024-03-09 RX ORDER — CLOPIDOGREL BISULFATE 75 MG/1
75 TABLET ORAL DAILY
Qty: 90 TABLET | Refills: 5 | Status: SHIPPED | OUTPATIENT
Start: 2024-03-09

## 2024-03-12 ENCOUNTER — OFFICE VISIT (OUTPATIENT)
Dept: PRIMARY CARE CLINIC | Age: 79
End: 2024-03-12
Payer: MEDICARE

## 2024-03-12 VITALS
OXYGEN SATURATION: 99 % | RESPIRATION RATE: 17 BRPM | HEIGHT: 70 IN | TEMPERATURE: 97.6 F | DIASTOLIC BLOOD PRESSURE: 82 MMHG | HEART RATE: 60 BPM | BODY MASS INDEX: 30.99 KG/M2 | SYSTOLIC BLOOD PRESSURE: 134 MMHG

## 2024-03-12 DIAGNOSIS — R73.01 IMPAIRED FASTING GLUCOSE: ICD-10-CM

## 2024-03-12 DIAGNOSIS — E03.9 ACQUIRED HYPOTHYROIDISM: ICD-10-CM

## 2024-03-12 DIAGNOSIS — Z00.00 MEDICARE ANNUAL WELLNESS VISIT, SUBSEQUENT: Primary | ICD-10-CM

## 2024-03-12 DIAGNOSIS — K21.9 GASTROESOPHAGEAL REFLUX DISEASE WITHOUT ESOPHAGITIS: Chronic | ICD-10-CM

## 2024-03-12 DIAGNOSIS — G80.2 SPASTIC HEMIPLEGIC CEREBRAL PALSY (HCC): ICD-10-CM

## 2024-03-12 DIAGNOSIS — E53.8 VITAMIN B 12 DEFICIENCY: ICD-10-CM

## 2024-03-12 DIAGNOSIS — I10 ESSENTIAL HYPERTENSION: Chronic | ICD-10-CM

## 2024-03-12 DIAGNOSIS — G62.9 NEUROPATHY: Chronic | ICD-10-CM

## 2024-03-12 DIAGNOSIS — M81.0 AGE-RELATED OSTEOPOROSIS WITHOUT CURRENT PATHOLOGICAL FRACTURE: ICD-10-CM

## 2024-03-12 DIAGNOSIS — M15.9 PRIMARY OSTEOARTHRITIS INVOLVING MULTIPLE JOINTS: Chronic | ICD-10-CM

## 2024-03-12 DIAGNOSIS — E78.2 MIXED HYPERLIPIDEMIA: Chronic | ICD-10-CM

## 2024-03-12 PROBLEM — E11.9 DIET-CONTROLLED DIABETES MELLITUS (HCC): Status: ACTIVE | Noted: 2024-03-12

## 2024-03-12 PROBLEM — E11.9 DIET-CONTROLLED DIABETES MELLITUS (HCC): Status: RESOLVED | Noted: 2024-03-12 | Resolved: 2024-03-12

## 2024-03-12 PROCEDURE — 3079F DIAST BP 80-89 MM HG: CPT | Performed by: FAMILY MEDICINE

## 2024-03-12 PROCEDURE — 3075F SYST BP GE 130 - 139MM HG: CPT | Performed by: FAMILY MEDICINE

## 2024-03-12 PROCEDURE — 1123F ACP DISCUSS/DSCN MKR DOCD: CPT | Performed by: FAMILY MEDICINE

## 2024-03-12 PROCEDURE — G0439 PPPS, SUBSEQ VISIT: HCPCS | Performed by: FAMILY MEDICINE

## 2024-03-12 SDOH — ECONOMIC STABILITY: FOOD INSECURITY: WITHIN THE PAST 12 MONTHS, THE FOOD YOU BOUGHT JUST DIDN'T LAST AND YOU DIDN'T HAVE MONEY TO GET MORE.: NEVER TRUE

## 2024-03-12 SDOH — ECONOMIC STABILITY: HOUSING INSECURITY
IN THE LAST 12 MONTHS, WAS THERE A TIME WHEN YOU DID NOT HAVE A STEADY PLACE TO SLEEP OR SLEPT IN A SHELTER (INCLUDING NOW)?: NO

## 2024-03-12 SDOH — ECONOMIC STABILITY: INCOME INSECURITY: HOW HARD IS IT FOR YOU TO PAY FOR THE VERY BASICS LIKE FOOD, HOUSING, MEDICAL CARE, AND HEATING?: NOT HARD AT ALL

## 2024-03-12 SDOH — ECONOMIC STABILITY: FOOD INSECURITY: WITHIN THE PAST 12 MONTHS, YOU WORRIED THAT YOUR FOOD WOULD RUN OUT BEFORE YOU GOT MONEY TO BUY MORE.: NEVER TRUE

## 2024-03-12 ASSESSMENT — PATIENT HEALTH QUESTIONNAIRE - PHQ9
2. FEELING DOWN, DEPRESSED OR HOPELESS: 0
1. LITTLE INTEREST OR PLEASURE IN DOING THINGS: 0
SUM OF ALL RESPONSES TO PHQ QUESTIONS 1-9: 0
SUM OF ALL RESPONSES TO PHQ9 QUESTIONS 1 & 2: 0

## 2024-03-12 ASSESSMENT — LIFESTYLE VARIABLES: HOW MANY STANDARD DRINKS CONTAINING ALCOHOL DO YOU HAVE ON A TYPICAL DAY: PATIENT DOES NOT DRINK

## 2024-03-12 NOTE — PROGRESS NOTES
mouth daily  ProviderLast MD   aspirin EC 81 MG EC tablet Take 1 tablet by mouth daily  ProviderLast MD       CareTeam (Including outside providers/suppliers regularly involved in providing care):   Patient Care Team:  Salvatore Robertson DO as PCP - General (Family Medicine)  Salvatore Robertson DO as PCP - Empaneled Provider  Chaitanya Li MD as Consulting Physician (Cardiology)     Reviewed and updated this visit:  Tobacco  Allergies  Med Hx  Surg Hx  Soc Hx  Fam Hx       Diet exer hm sieus  lsoe  wte  exer         I educated the patient about all medications.  Make sure they were correct and educated  on the risk associated with missing meds or taking them incorrectly.  A list of medications is being sent home with patient today.    Check blood pressure at home twice a day.  Low-salt low caffeine diet.  Call if systolic blood pressure is above 150 and diastolic blood pressures above 85.  Only use a upper arm digital cuff.  A great deal of time spent reviewing medications, diet, exercise, social issues. Also reviewing the chart before entering the room with patient and finishing charting after leaving patient's room. More than half of that time was spent face to face with the patient in counseling and coordinating care.  Aggressive low-fat diet.  Avoid red meats, greasy fried foods, dairy products.  Avoid processed foods.  Take cholesterol medications without food.    I informed patient about the risk associated with noncompliance of medication and taking medications incorrectly.  Appropriate follow-up with myself and all specialist.  Encourage family members to take active role in assisting with medications and medical care.  If any confusion should develop to notify my office immediately to avoid risk of worsening medical condition

## 2024-08-08 DIAGNOSIS — F41.9 ANXIETY: ICD-10-CM

## 2024-08-09 RX ORDER — FLUOXETINE HYDROCHLORIDE 20 MG/1
20 CAPSULE ORAL 2 TIMES DAILY
Qty: 180 CAPSULE | Refills: 5 | Status: SHIPPED | OUTPATIENT
Start: 2024-08-09

## 2024-08-27 DIAGNOSIS — E53.8 VITAMIN B 12 DEFICIENCY: ICD-10-CM

## 2024-08-27 DIAGNOSIS — E03.9 ACQUIRED HYPOTHYROIDISM: ICD-10-CM

## 2024-08-27 DIAGNOSIS — R73.01 IMPAIRED FASTING GLUCOSE: ICD-10-CM

## 2024-08-27 DIAGNOSIS — I10 ESSENTIAL HYPERTENSION: Chronic | ICD-10-CM

## 2024-08-27 DIAGNOSIS — M81.0 AGE-RELATED OSTEOPOROSIS WITHOUT CURRENT PATHOLOGICAL FRACTURE: ICD-10-CM

## 2024-08-27 LAB
ALBUMIN: 4 G/DL (ref 3.5–5.2)
ALP BLD-CCNC: 72 U/L (ref 40–129)
ALT SERPL-CCNC: 5 U/L (ref 0–40)
ANION GAP SERPL CALCULATED.3IONS-SCNC: 11 MMOL/L (ref 7–16)
AST SERPL-CCNC: 17 U/L (ref 0–39)
BASOPHILS ABSOLUTE: 0.05 K/UL (ref 0–0.2)
BASOPHILS RELATIVE PERCENT: 1 % (ref 0–2)
BILIRUB SERPL-MCNC: 0.4 MG/DL (ref 0–1.2)
BUN BLDV-MCNC: 13 MG/DL (ref 6–23)
CALCIUM SERPL-MCNC: 9.1 MG/DL (ref 8.6–10.2)
CHLORIDE BLD-SCNC: 105 MMOL/L (ref 98–107)
CHOLESTEROL, TOTAL: 133 MG/DL
CO2: 23 MMOL/L (ref 22–29)
CREAT SERPL-MCNC: 0.9 MG/DL (ref 0.7–1.2)
EOSINOPHILS ABSOLUTE: 0.15 K/UL (ref 0.05–0.5)
EOSINOPHILS RELATIVE PERCENT: 3 % (ref 0–6)
GFR, ESTIMATED: 88 ML/MIN/1.73M2
GLUCOSE BLD-MCNC: 91 MG/DL (ref 74–99)
HBA1C MFR BLD: 5.3 % (ref 4–5.6)
HCT VFR BLD CALC: 41 % (ref 37–54)
HDLC SERPL-MCNC: 42 MG/DL
HEMOGLOBIN: 13.2 G/DL (ref 12.5–16.5)
IMMATURE GRANULOCYTES %: 1 % (ref 0–5)
IMMATURE GRANULOCYTES ABSOLUTE: 0.03 K/UL (ref 0–0.58)
LDL CHOLESTEROL: 74 MG/DL
LYMPHOCYTES ABSOLUTE: 1.77 K/UL (ref 1.5–4)
LYMPHOCYTES RELATIVE PERCENT: 31 % (ref 20–42)
MCH RBC QN AUTO: 30.8 PG (ref 26–35)
MCHC RBC AUTO-ENTMCNC: 32.2 G/DL (ref 32–34.5)
MCV RBC AUTO: 95.8 FL (ref 80–99.9)
MONOCYTES ABSOLUTE: 0.53 K/UL (ref 0.1–0.95)
MONOCYTES RELATIVE PERCENT: 9 % (ref 2–12)
NEUTROPHILS ABSOLUTE: 3.13 K/UL (ref 1.8–7.3)
NEUTROPHILS RELATIVE PERCENT: 55 % (ref 43–80)
PDW BLD-RTO: 13.2 % (ref 11.5–15)
PLATELET # BLD: 199 K/UL (ref 130–450)
PMV BLD AUTO: 8.8 FL (ref 7–12)
POTASSIUM SERPL-SCNC: 4.5 MMOL/L (ref 3.5–5)
RBC # BLD: 4.28 M/UL (ref 3.8–5.8)
SODIUM BLD-SCNC: 139 MMOL/L (ref 132–146)
THYROXINE (T4): 5.2 UG/DL (ref 4.5–11.7)
TOTAL PROTEIN: 6.5 G/DL (ref 6.4–8.3)
TRIGL SERPL-MCNC: 86 MG/DL
TSH SERPL DL<=0.05 MIU/L-ACNC: 1.92 UIU/ML (ref 0.27–4.2)
VITAMIN B-12: 255 PG/ML (ref 211–946)
VITAMIN D 25-HYDROXY: 54 NG/ML (ref 30–100)
VLDLC SERPL CALC-MCNC: 17 MG/DL
WBC # BLD: 5.7 K/UL (ref 4.5–11.5)

## 2024-09-12 ENCOUNTER — OFFICE VISIT (OUTPATIENT)
Dept: PRIMARY CARE CLINIC | Age: 79
End: 2024-09-12
Payer: MEDICARE

## 2024-09-12 VITALS
SYSTOLIC BLOOD PRESSURE: 135 MMHG | WEIGHT: 216 LBS | HEART RATE: 60 BPM | DIASTOLIC BLOOD PRESSURE: 82 MMHG | TEMPERATURE: 97.8 F | HEIGHT: 70 IN | BODY MASS INDEX: 30.92 KG/M2 | OXYGEN SATURATION: 95 %

## 2024-09-12 DIAGNOSIS — I10 ESSENTIAL HYPERTENSION: Primary | Chronic | ICD-10-CM

## 2024-09-12 DIAGNOSIS — E78.2 MIXED HYPERLIPIDEMIA: Chronic | ICD-10-CM

## 2024-09-12 DIAGNOSIS — M81.0 AGE-RELATED OSTEOPOROSIS WITHOUT CURRENT PATHOLOGICAL FRACTURE: ICD-10-CM

## 2024-09-12 DIAGNOSIS — G80.2 SPASTIC HEMIPLEGIC CEREBRAL PALSY (HCC): Chronic | ICD-10-CM

## 2024-09-12 DIAGNOSIS — I95.1 ORTHOSTATIC HYPOTENSION: Chronic | ICD-10-CM

## 2024-09-12 DIAGNOSIS — R73.01 IMPAIRED FASTING GLUCOSE: ICD-10-CM

## 2024-09-12 DIAGNOSIS — M15.9 PRIMARY OSTEOARTHRITIS INVOLVING MULTIPLE JOINTS: Chronic | ICD-10-CM

## 2024-09-12 DIAGNOSIS — Z12.5 PROSTATE CANCER SCREENING: ICD-10-CM

## 2024-09-12 DIAGNOSIS — E03.9 ACQUIRED HYPOTHYROIDISM: ICD-10-CM

## 2024-09-12 DIAGNOSIS — E53.8 VITAMIN B 12 DEFICIENCY: ICD-10-CM

## 2024-09-12 PROCEDURE — 3075F SYST BP GE 130 - 139MM HG: CPT | Performed by: FAMILY MEDICINE

## 2024-09-12 PROCEDURE — 99214 OFFICE O/P EST MOD 30 MIN: CPT | Performed by: FAMILY MEDICINE

## 2024-09-12 PROCEDURE — 3079F DIAST BP 80-89 MM HG: CPT | Performed by: FAMILY MEDICINE

## 2024-09-12 PROCEDURE — 1123F ACP DISCUSS/DSCN MKR DOCD: CPT | Performed by: FAMILY MEDICINE

## 2024-09-12 RX ORDER — LOSARTAN POTASSIUM 25 MG/1
25 TABLET ORAL EVERY MORNING
Qty: 90 TABLET | Refills: 3 | Status: SHIPPED | OUTPATIENT
Start: 2024-09-12

## 2024-09-12 SDOH — ECONOMIC STABILITY: FOOD INSECURITY: WITHIN THE PAST 12 MONTHS, THE FOOD YOU BOUGHT JUST DIDN'T LAST AND YOU DIDN'T HAVE MONEY TO GET MORE.: NEVER TRUE

## 2024-09-12 SDOH — ECONOMIC STABILITY: FOOD INSECURITY: WITHIN THE PAST 12 MONTHS, YOU WORRIED THAT YOUR FOOD WOULD RUN OUT BEFORE YOU GOT MONEY TO BUY MORE.: NEVER TRUE

## 2024-09-12 SDOH — ECONOMIC STABILITY: INCOME INSECURITY: HOW HARD IS IT FOR YOU TO PAY FOR THE VERY BASICS LIKE FOOD, HOUSING, MEDICAL CARE, AND HEATING?: NOT HARD AT ALL

## 2024-09-12 ASSESSMENT — ENCOUNTER SYMPTOMS
GASTROINTESTINAL NEGATIVE: 1
EYES NEGATIVE: 1
ALLERGIC/IMMUNOLOGIC NEGATIVE: 1
RESPIRATORY NEGATIVE: 1

## 2024-09-12 ASSESSMENT — PATIENT HEALTH QUESTIONNAIRE - PHQ9
SUM OF ALL RESPONSES TO PHQ QUESTIONS 1-9: 0
2. FEELING DOWN, DEPRESSED OR HOPELESS: NOT AT ALL
SUM OF ALL RESPONSES TO PHQ QUESTIONS 1-9: 0
1. LITTLE INTEREST OR PLEASURE IN DOING THINGS: NOT AT ALL
SUM OF ALL RESPONSES TO PHQ QUESTIONS 1-9: 0
SUM OF ALL RESPONSES TO PHQ QUESTIONS 1-9: 0
SUM OF ALL RESPONSES TO PHQ9 QUESTIONS 1 & 2: 0

## 2025-03-05 DIAGNOSIS — I10 ESSENTIAL HYPERTENSION: Chronic | ICD-10-CM

## 2025-03-05 DIAGNOSIS — E03.9 ACQUIRED HYPOTHYROIDISM: ICD-10-CM

## 2025-03-05 DIAGNOSIS — E53.8 VITAMIN B 12 DEFICIENCY: ICD-10-CM

## 2025-03-05 DIAGNOSIS — Z12.5 PROSTATE CANCER SCREENING: ICD-10-CM

## 2025-03-05 DIAGNOSIS — E78.2 MIXED HYPERLIPIDEMIA: Chronic | ICD-10-CM

## 2025-03-05 DIAGNOSIS — M81.0 AGE-RELATED OSTEOPOROSIS WITHOUT CURRENT PATHOLOGICAL FRACTURE: ICD-10-CM

## 2025-03-05 LAB
ALBUMIN: 3.7 G/DL (ref 3.5–5.2)
ALP BLD-CCNC: 79 U/L (ref 40–129)
ALT SERPL-CCNC: 10 U/L (ref 0–40)
ANION GAP SERPL CALCULATED.3IONS-SCNC: 12 MMOL/L (ref 7–16)
AST SERPL-CCNC: 18 U/L (ref 0–39)
BASOPHILS ABSOLUTE: 0.05 K/UL (ref 0–0.2)
BASOPHILS RELATIVE PERCENT: 1 % (ref 0–2)
BILIRUB SERPL-MCNC: 0.6 MG/DL (ref 0–1.2)
BUN BLDV-MCNC: 16 MG/DL (ref 6–23)
CALCIUM SERPL-MCNC: 9.2 MG/DL (ref 8.6–10.2)
CHLORIDE BLD-SCNC: 104 MMOL/L (ref 98–107)
CHOLESTEROL, TOTAL: 122 MG/DL
CO2: 23 MMOL/L (ref 22–29)
CREAT SERPL-MCNC: 0.7 MG/DL (ref 0.7–1.2)
EOSINOPHILS ABSOLUTE: 0.13 K/UL (ref 0.05–0.5)
EOSINOPHILS RELATIVE PERCENT: 2 % (ref 0–6)
GFR, ESTIMATED: >90 ML/MIN/1.73M2
GLUCOSE BLD-MCNC: 100 MG/DL (ref 74–99)
HCT VFR BLD CALC: 41 % (ref 37–54)
HDLC SERPL-MCNC: 39 MG/DL
HEMOGLOBIN: 13.4 G/DL (ref 12.5–16.5)
IMMATURE GRANULOCYTES %: 1 % (ref 0–5)
IMMATURE GRANULOCYTES ABSOLUTE: 0.03 K/UL (ref 0–0.58)
LDL CHOLESTEROL: 62 MG/DL
LYMPHOCYTES ABSOLUTE: 2.13 K/UL (ref 1.5–4)
LYMPHOCYTES RELATIVE PERCENT: 34 % (ref 20–42)
MCH RBC QN AUTO: 30.2 PG (ref 26–35)
MCHC RBC AUTO-ENTMCNC: 32.7 G/DL (ref 32–34.5)
MCV RBC AUTO: 92.6 FL (ref 80–99.9)
MONOCYTES ABSOLUTE: 0.58 K/UL (ref 0.1–0.95)
MONOCYTES RELATIVE PERCENT: 9 % (ref 2–12)
NEUTROPHILS ABSOLUTE: 3.39 K/UL (ref 1.8–7.3)
NEUTROPHILS RELATIVE PERCENT: 54 % (ref 43–80)
PDW BLD-RTO: 13.3 % (ref 11.5–15)
PLATELET # BLD: 224 K/UL (ref 130–450)
PMV BLD AUTO: 9.4 FL (ref 7–12)
POTASSIUM SERPL-SCNC: 5 MMOL/L (ref 3.5–5)
PROSTATE SPECIFIC ANTIGEN: 1.31 NG/ML (ref 0–4)
RBC # BLD: 4.43 M/UL (ref 3.8–5.8)
SODIUM BLD-SCNC: 139 MMOL/L (ref 132–146)
THYROXINE (T4): 5.1 UG/DL (ref 4.5–11.7)
TOTAL PROTEIN: 6.8 G/DL (ref 6.4–8.3)
TRIGL SERPL-MCNC: 105 MG/DL
TSH SERPL DL<=0.05 MIU/L-ACNC: 2.43 UIU/ML (ref 0.27–4.2)
VITAMIN B-12: 828 PG/ML (ref 211–946)
VITAMIN D 25-HYDROXY: 52 NG/ML (ref 30–100)
VLDLC SERPL CALC-MCNC: 21 MG/DL
WBC # BLD: 6.3 K/UL (ref 4.5–11.5)

## 2025-03-18 ENCOUNTER — OFFICE VISIT (OUTPATIENT)
Dept: PRIMARY CARE CLINIC | Age: 80
End: 2025-03-18
Payer: MEDICARE

## 2025-03-18 VITALS
DIASTOLIC BLOOD PRESSURE: 82 MMHG | TEMPERATURE: 97.4 F | OXYGEN SATURATION: 97 % | WEIGHT: 204 LBS | HEART RATE: 55 BPM | BODY MASS INDEX: 29.27 KG/M2 | RESPIRATION RATE: 18 BRPM | SYSTOLIC BLOOD PRESSURE: 135 MMHG

## 2025-03-18 DIAGNOSIS — R73.03 PRE-DIABETES: ICD-10-CM

## 2025-03-18 DIAGNOSIS — M19.042 PRIMARY OSTEOARTHRITIS OF LEFT HAND: ICD-10-CM

## 2025-03-18 DIAGNOSIS — Z00.00 MEDICARE ANNUAL WELLNESS VISIT, SUBSEQUENT: Primary | ICD-10-CM

## 2025-03-18 DIAGNOSIS — E78.2 MIXED HYPERLIPIDEMIA: Chronic | ICD-10-CM

## 2025-03-18 DIAGNOSIS — E55.9 VITAMIN D DEFICIENCY: ICD-10-CM

## 2025-03-18 DIAGNOSIS — E53.8 VITAMIN B 12 DEFICIENCY: ICD-10-CM

## 2025-03-18 DIAGNOSIS — I95.1 ORTHOSTATIC HYPOTENSION: Chronic | ICD-10-CM

## 2025-03-18 DIAGNOSIS — I10 ESSENTIAL HYPERTENSION: Chronic | ICD-10-CM

## 2025-03-18 DIAGNOSIS — G45.9 TIA (TRANSIENT ISCHEMIC ATTACK): ICD-10-CM

## 2025-03-18 DIAGNOSIS — G80.2 SPASTIC HEMIPLEGIC CEREBRAL PALSY (HCC): ICD-10-CM

## 2025-03-18 DIAGNOSIS — M15.0 PRIMARY OSTEOARTHRITIS INVOLVING MULTIPLE JOINTS: Chronic | ICD-10-CM

## 2025-03-18 DIAGNOSIS — G62.9 NEUROPATHY: Chronic | ICD-10-CM

## 2025-03-18 PROBLEM — R73.01 IMPAIRED FASTING GLUCOSE: Status: RESOLVED | Noted: 2024-03-12 | Resolved: 2025-03-18

## 2025-03-18 PROCEDURE — 3079F DIAST BP 80-89 MM HG: CPT | Performed by: FAMILY MEDICINE

## 2025-03-18 PROCEDURE — 1123F ACP DISCUSS/DSCN MKR DOCD: CPT | Performed by: FAMILY MEDICINE

## 2025-03-18 PROCEDURE — 1159F MED LIST DOCD IN RCRD: CPT | Performed by: FAMILY MEDICINE

## 2025-03-18 PROCEDURE — 3075F SYST BP GE 130 - 139MM HG: CPT | Performed by: FAMILY MEDICINE

## 2025-03-18 PROCEDURE — G0439 PPPS, SUBSEQ VISIT: HCPCS | Performed by: FAMILY MEDICINE

## 2025-03-18 ASSESSMENT — PATIENT HEALTH QUESTIONNAIRE - PHQ9
SUM OF ALL RESPONSES TO PHQ QUESTIONS 1-9: 0
2. FEELING DOWN, DEPRESSED OR HOPELESS: NOT AT ALL
1. LITTLE INTEREST OR PLEASURE IN DOING THINGS: NOT AT ALL
SUM OF ALL RESPONSES TO PHQ QUESTIONS 1-9: 0

## 2025-03-18 ASSESSMENT — LIFESTYLE VARIABLES
HOW MANY STANDARD DRINKS CONTAINING ALCOHOL DO YOU HAVE ON A TYPICAL DAY: PATIENT DOES NOT DRINK
HOW OFTEN DO YOU HAVE A DRINK CONTAINING ALCOHOL: NEVER

## 2025-03-18 NOTE — PROGRESS NOTES
myself and all specialist.  Encourage family members to take active role in assisting with medications and medical care.  If any confusion should develop to notify my office immediately to avoid risk of worsening medical condition          The patient (or guardian, if applicable) and other individuals in attendance with the patient were advised that Artificial Intelligence will be utilized during this visit to record and process the conversation to generate a clinical note. The patient (or guardian, if applicable) and other individuals in attendance at the appointment consented to the use of AI, including the recording.

## 2025-03-24 DIAGNOSIS — I10 ESSENTIAL HYPERTENSION: Chronic | ICD-10-CM

## 2025-03-24 DIAGNOSIS — G45.9 TIA (TRANSIENT ISCHEMIC ATTACK): ICD-10-CM

## 2025-03-24 RX ORDER — CLOPIDOGREL BISULFATE 75 MG/1
75 TABLET ORAL DAILY
Qty: 90 TABLET | Refills: 5 | Status: SHIPPED | OUTPATIENT
Start: 2025-03-24

## 2025-03-24 RX ORDER — METOPROLOL SUCCINATE 50 MG/1
50 TABLET, EXTENDED RELEASE ORAL 2 TIMES DAILY
Qty: 180 TABLET | Refills: 5 | Status: SHIPPED | OUTPATIENT
Start: 2025-03-24

## 2025-06-10 DIAGNOSIS — I10 ESSENTIAL HYPERTENSION: Chronic | ICD-10-CM

## 2025-06-10 RX ORDER — METOPROLOL SUCCINATE 50 MG/1
50 TABLET, EXTENDED RELEASE ORAL 2 TIMES DAILY
Qty: 180 TABLET | Refills: 3 | Status: SHIPPED | OUTPATIENT
Start: 2025-06-10